# Patient Record
Sex: MALE | Race: WHITE | Employment: FULL TIME | URBAN - METROPOLITAN AREA
[De-identification: names, ages, dates, MRNs, and addresses within clinical notes are randomized per-mention and may not be internally consistent; named-entity substitution may affect disease eponyms.]

---

## 2017-01-11 RX ORDER — FLUTICASONE PROPIONATE 50 MCG
SPRAY, SUSPENSION (ML) NASAL
Qty: 1 BOTTLE | Refills: 11 | Status: SHIPPED | OUTPATIENT
Start: 2017-01-11 | End: 2018-01-30

## 2017-01-11 NOTE — TELEPHONE ENCOUNTER
E request received  LOV: 12/18/15- allergies  Last rx: 12/18/15- 1 bottle with 11 refills  No future appointments.   Per protocol to provider  Pended as same

## 2017-02-10 ENCOUNTER — OFFICE VISIT (OUTPATIENT)
Dept: INTERNAL MEDICINE CLINIC | Facility: CLINIC | Age: 27
End: 2017-02-10

## 2017-02-10 VITALS
DIASTOLIC BLOOD PRESSURE: 70 MMHG | HEART RATE: 70 BPM | HEIGHT: 73 IN | RESPIRATION RATE: 16 BRPM | TEMPERATURE: 98 F | WEIGHT: 203.81 LBS | SYSTOLIC BLOOD PRESSURE: 116 MMHG | BODY MASS INDEX: 27.01 KG/M2

## 2017-02-10 DIAGNOSIS — L01.00 IMPETIGO: Primary | ICD-10-CM

## 2017-02-10 PROCEDURE — 99213 OFFICE O/P EST LOW 20 MIN: CPT | Performed by: FAMILY MEDICINE

## 2017-02-11 NOTE — PATIENT INSTRUCTIONS
Understanding Impetigo  Impetigo is a common bacterial infection of the skin. It most often affects the face, arms, and legs. But it can appear on any part of the body. Anyone can have it, regardless of age. But it is most common in children.  Impetigo is · To prevent spreading the infection, wash your hands often. Avoid sharing personal items, towels, clothes, pillows, and sheets with others. After each use, wash these items in hot water. · Clean the affected skin several times a day. Don’t scrub.  Instead

## 2017-02-11 NOTE — PROGRESS NOTES
HPI:    Patient ID: Christiano Salmon is a 27year old male. HPI  Here with few days of crusting and sores above upper lip. States he shaved and thinks this started soon after. Has been putting lotion on it but this doesn't seem to help.  Feels dry and c

## 2017-03-31 ENCOUNTER — OFFICE VISIT (OUTPATIENT)
Dept: INTERNAL MEDICINE CLINIC | Facility: CLINIC | Age: 27
End: 2017-03-31

## 2017-03-31 VITALS
DIASTOLIC BLOOD PRESSURE: 70 MMHG | BODY MASS INDEX: 27.52 KG/M2 | RESPIRATION RATE: 16 BRPM | HEART RATE: 64 BPM | WEIGHT: 207.63 LBS | TEMPERATURE: 98 F | SYSTOLIC BLOOD PRESSURE: 124 MMHG | HEIGHT: 73 IN

## 2017-03-31 DIAGNOSIS — K29.00 ACUTE GASTRITIS WITHOUT HEMORRHAGE, UNSPECIFIED GASTRITIS TYPE: Primary | ICD-10-CM

## 2017-03-31 PROCEDURE — 99213 OFFICE O/P EST LOW 20 MIN: CPT | Performed by: FAMILY MEDICINE

## 2017-03-31 RX ORDER — RANITIDINE 150 MG/1
150 TABLET ORAL 2 TIMES DAILY
Qty: 28 TABLET | Refills: 0 | Status: SHIPPED | OUTPATIENT
Start: 2017-03-31 | End: 2017-04-14

## 2017-03-31 NOTE — PROGRESS NOTES
HPI:    Patient ID: Lucero Adair is a 27year old male. HPI Here with c/o feeling LUQ pain, irritation, burning for 2 weeks. This occurs after eating. Is non-radiating. Goes away on its own- patient hasn't tried taking anything for this. No fever. Refills    RaNITidine HCl 150 MG Oral Tab 28 tablet 0      Sig: Take 1 tablet (150 mg total) by mouth 2 (two) times daily.            Imaging & Referrals:  None       #[id number]

## 2017-03-31 NOTE — PATIENT INSTRUCTIONS
Gastritis (Adult)    Gastritis is inflammation and irritation of the stomach lining. It can be present for a short time (acute) or be long lasting (chronic).  Gastritis is often caused by infection with bacteria called H pylori. More than a third of peopl · Stomach pain that gets worse or moves to the lower right abdomen (appendix area)  · Chest pain that appears or gets worse, or spreads to the back, neck, shoulder, or arm  · Frequent vomiting (can’t keep down liquids)  · Blood in the stool or vomit (red o

## 2017-03-31 NOTE — H&P
[facility], [address] [place] [address] Cam    History and Physical     Patient Status:  No patient class for patient encounter    9/15/1990 MRN [id number]   Location [address] Attending No att.  pro Lipids Maternal Grandmother      hyperlipidemia   • Hypertension Maternal Grandmother    • Diabetes Maternal Grandmother    • Heart Disorder Maternal Grandmother      atrial fibrillation   • Thyroid Disorder Other      hypothyroidism, Siblings   • Nancy

## 2017-03-31 NOTE — PROGRESS NOTES
HPI:    Patient ID: Prashant Jackson is a 27year old male. HPI   Student note:     HPI CC of LUQ sharp pain, started 2 weeks ago for unknown reasons.  Of note, patient was recently promoted to  at [address] [place] [address] around the same time and admit were placed in this encounter. 1. Discussed probable gastroenteritis and GERD as well as triggers. Prescribed Ranitidine 1 tabs BID for 1-2 weeks. If churning, burping, bloating pain does not subside suggested taking OTC gas-x.      Meds This Visit:  S

## 2017-06-07 ENCOUNTER — OFFICE VISIT (OUTPATIENT)
Dept: INTERNAL MEDICINE CLINIC | Facility: CLINIC | Age: 27
End: 2017-06-07

## 2017-06-07 ENCOUNTER — TELEPHONE (OUTPATIENT)
Dept: INTERNAL MEDICINE CLINIC | Facility: CLINIC | Age: 27
End: 2017-06-07

## 2017-06-07 VITALS
WEIGHT: 198 LBS | HEIGHT: 73 IN | TEMPERATURE: 98 F | SYSTOLIC BLOOD PRESSURE: 124 MMHG | RESPIRATION RATE: 17 BRPM | OXYGEN SATURATION: 98 % | BODY MASS INDEX: 26.24 KG/M2 | DIASTOLIC BLOOD PRESSURE: 70 MMHG | HEART RATE: 73 BPM

## 2017-06-07 DIAGNOSIS — Z13.0 SCREENING FOR DISORDER OF BLOOD AND BLOOD-FORMING ORGANS: ICD-10-CM

## 2017-06-07 DIAGNOSIS — Z00.00 ROUTINE GENERAL MEDICAL EXAMINATION AT A HEALTH CARE FACILITY: Primary | ICD-10-CM

## 2017-06-07 DIAGNOSIS — S16.1XXA NECK STRAIN, INITIAL ENCOUNTER: Primary | ICD-10-CM

## 2017-06-07 DIAGNOSIS — Z13.228 SCREENING FOR METABOLIC DISORDER: ICD-10-CM

## 2017-06-07 DIAGNOSIS — Z13.220 SCREENING FOR LIPID DISORDERS: ICD-10-CM

## 2017-06-07 PROCEDURE — 99213 OFFICE O/P EST LOW 20 MIN: CPT | Performed by: FAMILY MEDICINE

## 2017-06-07 RX ORDER — CYCLOBENZAPRINE HCL 10 MG
10 TABLET ORAL NIGHTLY
Qty: 14 TABLET | Refills: 0 | Status: SHIPPED | OUTPATIENT
Start: 2017-06-07 | End: 2017-11-28

## 2017-06-07 RX ORDER — METHYLPREDNISOLONE 4 MG/1
TABLET ORAL
Qty: 1 KIT | Refills: 0 | Status: SHIPPED | OUTPATIENT
Start: 2017-06-07 | End: 2017-06-27
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
## 2017-06-08 NOTE — PATIENT INSTRUCTIONS
Neck Sprain or Strain  A sudden force that causes turning or bending of the neck can cause sprain or strain. An example would be the force from a car accident. This can stretch or tear muscles called a strain.  It can also stretch or tear ligaments called Follow up with your healthcare provider as directed. Physical therapy may be needed. Sometimes fractures don’t show up on the first X-ray. Bruises and sprains can sometimes hurt as much as a fracture. These injuries can take time to heal completely.  If yo

## 2017-06-13 NOTE — TELEPHONE ENCOUNTER
LOV:6/7/17  Future Appt: 6/27/17  Last labs: none  Protocol to provider   Please advise what/if any other labs needed.

## 2017-06-14 NOTE — TELEPHONE ENCOUNTER
Note above says send orders to Sahra Regan. He has [facility] listed as his lab. Please call to find out if [employer]. I placed for [facility] so change if [facility].

## 2017-06-14 NOTE — TELEPHONE ENCOUNTER
Left a detailed VM (at preferred number- ok per HIPPA)  informing Pt that labs were placed and Pt is to be fasting. Pt was advised to call clinic if any concerns or changes.

## 2017-06-27 ENCOUNTER — OFFICE VISIT (OUTPATIENT)
Dept: INTERNAL MEDICINE CLINIC | Facility: CLINIC | Age: 27
End: 2017-06-27

## 2017-06-27 VITALS
SYSTOLIC BLOOD PRESSURE: 116 MMHG | TEMPERATURE: 98 F | HEIGHT: 73 IN | RESPIRATION RATE: 17 BRPM | WEIGHT: 192.63 LBS | BODY MASS INDEX: 25.53 KG/M2 | HEART RATE: 84 BPM | DIASTOLIC BLOOD PRESSURE: 80 MMHG

## 2017-06-27 DIAGNOSIS — Z00.00 ANNUAL PHYSICAL EXAM: Primary | ICD-10-CM

## 2017-06-27 DIAGNOSIS — E78.1 HYPERTRIGLYCERIDEMIA: ICD-10-CM

## 2017-06-27 PROCEDURE — 90715 TDAP VACCINE 7 YRS/> IM: CPT | Performed by: FAMILY MEDICINE

## 2017-06-27 PROCEDURE — 90471 IMMUNIZATION ADMIN: CPT | Performed by: FAMILY MEDICINE

## 2017-06-27 PROCEDURE — 99395 PREV VISIT EST AGE 18-39: CPT | Performed by: FAMILY MEDICINE

## 2017-06-27 NOTE — PATIENT INSTRUCTIONS
Prevention Guidelines, Men Ages 25 to 44  Screening tests and vaccines are an important part of managing your health. Health counseling is essential, too. Below are guidelines for these, for men ages 25 to 44.  Talk with your healthcare provider to make s Hepatitis B Men at increased risk for infection – talk with your healthcare provider 3 doses over 6 months; second dose should be given 1 month after the first dose; the third dose should be given at least 2 months after the second dose and at least 4 mat Date Last Reviewed: 3/30/2015  © 0998-8338 [address], [address] [place], [place] [address]. All rights reserved. This information is not intended as a substitute for professional medical care.  Always follow your healthcare professional

## 2017-06-27 NOTE — PROGRESS NOTES
HPI:    Patient ID: Josiah Rodriguez is a 27year old male. HPI Here for annual check-up. Patient has no complaints. Job is very physical and as a result patient has lost weight. Still eats a lot of fast food though.      Past Medical History:   Diagnos Grandfather    • Cancer Maternal Grandfather    • Thyroid Disorder Other      hypothyroidism, Siblings   • Arthritis Maternal Grandfather          Review of Systems   Constitutional: Negative for chills, fatigue and fever.    HENT: Negative for hearing loss sounds normal.   Neurological: He is alert and oriented to person, place, and time. Skin: Skin is warm and dry. Psychiatric: He has a normal mood and affect. His behavior is normal.   Vitals reviewed.              ASSESSMENT/PLAN:   Annual physical exam

## 2017-11-28 ENCOUNTER — OFFICE VISIT (OUTPATIENT)
Dept: INTERNAL MEDICINE CLINIC | Facility: CLINIC | Age: 27
End: 2017-11-28

## 2017-11-28 VITALS
WEIGHT: 207 LBS | HEART RATE: 98 BPM | SYSTOLIC BLOOD PRESSURE: 118 MMHG | TEMPERATURE: 98 F | BODY MASS INDEX: 27.43 KG/M2 | HEIGHT: 73 IN | RESPIRATION RATE: 16 BRPM | DIASTOLIC BLOOD PRESSURE: 76 MMHG

## 2017-11-28 DIAGNOSIS — J00 ACUTE NASOPHARYNGITIS: Primary | ICD-10-CM

## 2017-11-28 PROCEDURE — 99213 OFFICE O/P EST LOW 20 MIN: CPT | Performed by: FAMILY MEDICINE

## 2017-11-28 RX ORDER — IBUPROFEN 600 MG/1
600 TABLET ORAL EVERY 8 HOURS PRN
Qty: 30 TABLET | Refills: 0 | Status: SHIPPED | OUTPATIENT
Start: 2017-11-28 | End: 2018-12-19

## 2017-11-28 NOTE — PROGRESS NOTES
HPI:    Patient ID: Joanna Correa is a 32year old male. HPI Here with 2 day h/o sore throat, cough, runny nose. No fever. No shortness of breath. Not taking anything in addition to the Flonase he takes regularly.      Review of Systems   Constitutio (eight) hours as needed for Pain.            Imaging & Referrals:  None       HR#[id number]

## 2017-12-04 ENCOUNTER — TELEPHONE (OUTPATIENT)
Dept: INTERNAL MEDICINE CLINIC | Facility: CLINIC | Age: 27
End: 2017-12-04

## 2017-12-04 NOTE — TELEPHONE ENCOUNTER
Called pt to inform ,per AMS, may come in for further evaluation or may monitor to see if resolves- ok to try Benadryl for itch.  Pt verbalized understanding and agreed with POC, stating will monitor for now, will follow up office visit if symptoms worsen,

## 2017-12-04 NOTE — TELEPHONE ENCOUNTER
Pt stating was seen on 11/28/17 for sore throat. For one week has been taking ibuprofen 600 1 tablet daily. Noted last night break out rash all over from body- neck down to chest, arms, legs. Appeared blister-like and itchy.  Today rash has subsided on uppe

## 2017-12-04 NOTE — TELEPHONE ENCOUNTER
Pt stated taking ibuprofen 600 MG Oral Tab a week ago and lastnight pt had spots all over and now today only on his thighs-also vomitted lastnight-woke up today feels queezy and rash only on thighs now-reaction to this med?  Call to advise

## 2017-12-06 ENCOUNTER — OFFICE VISIT (OUTPATIENT)
Dept: INTERNAL MEDICINE CLINIC | Facility: CLINIC | Age: 27
End: 2017-12-06

## 2017-12-06 VITALS
WEIGHT: 208 LBS | BODY MASS INDEX: 27 KG/M2 | RESPIRATION RATE: 16 BRPM | TEMPERATURE: 98 F | DIASTOLIC BLOOD PRESSURE: 66 MMHG | HEART RATE: 70 BPM | SYSTOLIC BLOOD PRESSURE: 108 MMHG

## 2017-12-06 DIAGNOSIS — J02.9 PHARYNGITIS, UNSPECIFIED ETIOLOGY: Primary | ICD-10-CM

## 2017-12-06 DIAGNOSIS — R21 RASH: ICD-10-CM

## 2017-12-06 PROCEDURE — 87081 CULTURE SCREEN ONLY: CPT | Performed by: FAMILY MEDICINE

## 2017-12-06 PROCEDURE — 87880 STREP A ASSAY W/OPTIC: CPT | Performed by: FAMILY MEDICINE

## 2017-12-06 PROCEDURE — 99213 OFFICE O/P EST LOW 20 MIN: CPT | Performed by: FAMILY MEDICINE

## 2017-12-06 RX ORDER — PREDNISONE 20 MG/1
TABLET ORAL
Qty: 14 TABLET | Refills: 0 | Status: SHIPPED | OUTPATIENT
Start: 2017-12-06 | End: 2018-02-03

## 2017-12-06 NOTE — PROGRESS NOTES
HPI:    Patient ID: Zack Hightower is a 32year old male. HPI Here with rash for about 3 days. Itchy, was initially all over his body but now mainly his thighs and right arm. Hasn't had this rash before. No new products/foods.  Did start taking Rx Ibu proper use of medication. Call or return if persists or worsens.      Orders Placed This Encounter      Rapid Strep      Grp A Strep Cult, Throat [E]    Meds This Visit:  Signed Prescriptions Disp Refills    predniSONE 20 MG Oral Tab 14 tablet 0      Sig: 3

## 2018-01-30 RX ORDER — FLUTICASONE PROPIONATE 50 MCG
SPRAY, SUSPENSION (ML) NASAL
Qty: 3 BOTTLE | Refills: 3 | Status: SHIPPED | OUTPATIENT
Start: 2018-01-30 | End: 2019-02-11

## 2018-01-30 NOTE — TELEPHONE ENCOUNTER
E request  Medication(s) to Refill:   Pending Prescriptions Disp Refills    FLUTICASONE PROPIONATE 50 MCG/ACT Nasal Suspension [Pharmacy Med Name: FLUTICASONE 50MCG   [facility]]  11     Sig: USE TWO SPRAY(S) IN EACH NOSTRIL ONCE DAILY           Last Time Medicat

## 2018-02-03 ENCOUNTER — OFFICE VISIT (OUTPATIENT)
Dept: FAMILY MEDICINE CLINIC | Facility: CLINIC | Age: 28
End: 2018-02-03

## 2018-02-03 VITALS
WEIGHT: 208 LBS | DIASTOLIC BLOOD PRESSURE: 86 MMHG | RESPIRATION RATE: 20 BRPM | HEART RATE: 61 BPM | OXYGEN SATURATION: 98 % | BODY MASS INDEX: 27.57 KG/M2 | SYSTOLIC BLOOD PRESSURE: 136 MMHG | TEMPERATURE: 98 F | HEIGHT: 73 IN

## 2018-02-03 DIAGNOSIS — J11.1 INFLUENZA-LIKE ILLNESS: Primary | ICD-10-CM

## 2018-02-03 PROCEDURE — 99213 OFFICE O/P EST LOW 20 MIN: CPT | Performed by: PHYSICIAN ASSISTANT

## 2018-02-03 NOTE — PROGRESS NOTES
CHIEF COMPLAINT:   Patient presents with:  Nasal Congestion: sinus pressure,bodyache,fever,sore throat and coughing x 1 wk      HPI:   Chato Mcdonald is a 32year old male who presents for sudden onset of flu-like symptoms. Symptoms began 1 week ago.   P lesions  HEENT: See HPI  LUNGS: denies shortness of breath or wheezing, See HPI  CARDIOVASCULAR: denies chest pain or palpitations   GI: denies abdominal pain      EXAM:   /86   Pulse 61   Temp 98.4 °F (36.9 °C) (Oral)   Resp 20   Ht 73\"   Wt 208 lb like illness/ nasal congestion    1. At home Flonase  2. OTC symptomatic support  3. Increase fluids/ rest  4. Follow up with PCP  5.  If return of fever or worsening symptoms seek treatment      The patient indicates understanding of these issues and agre

## 2018-02-03 NOTE — PATIENT INSTRUCTIONS
Influenza like illness/ nasal congestion    1. At home Flonase  2. OTC symptomatic support  3. Increase fluids/ rest  4. Follow up with PCP  5.  If return of fever or worsening symptoms seek treatment

## 2018-05-15 ENCOUNTER — OFFICE VISIT (OUTPATIENT)
Dept: INTERNAL MEDICINE CLINIC | Facility: CLINIC | Age: 28
End: 2018-05-15

## 2018-05-15 VITALS
SYSTOLIC BLOOD PRESSURE: 122 MMHG | OXYGEN SATURATION: 98 % | BODY MASS INDEX: 28.91 KG/M2 | HEART RATE: 74 BPM | DIASTOLIC BLOOD PRESSURE: 76 MMHG | TEMPERATURE: 98 F | HEIGHT: 73 IN | RESPIRATION RATE: 18 BRPM | WEIGHT: 218.13 LBS
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
## 2018-05-15 DIAGNOSIS — S39.012A BACK STRAIN, INITIAL ENCOUNTER: ICD-10-CM

## 2018-05-15 PROCEDURE — 99998 NO SHOW: CPT | Performed by: FAMILY MEDICINE

## 2018-05-15 PROCEDURE — 99213 OFFICE O/P EST LOW 20 MIN: CPT | Performed by: FAMILY MEDICINE

## 2018-05-15 RX ORDER — NAPROXEN 500 MG/1
500 TABLET ORAL 2 TIMES DAILY WITH MEALS
Qty: 28 TABLET | Refills: 0 | Status: SHIPPED | OUTPATIENT
Start: 2018-05-15 | End: 2018-12-19 | Stop reason: ALTCHOICE
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
## 2018-06-21 ENCOUNTER — PATIENT MESSAGE (OUTPATIENT)
Dept: INTERNAL MEDICINE CLINIC | Facility: CLINIC | Age: 28
End: 2018-06-21

## 2018-06-22 NOTE — TELEPHONE ENCOUNTER
Spoke with pt, states sharp, stabbing pain in lumbar area, mainly just bending, 7/10 pain, no OTC pain meds yet, started 2 weeks ago after starting new job, still has some naproxen that he was going to take. Reports it is still hard to sleep.   Due to pt's
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
## 2018-06-22 NOTE — TELEPHONE ENCOUNTER
Called pt, left VM advising pt to call back to schedule next week to see AS. Advised if pt is unable to tolerate pain over the weekend to proceed to urgent care or ER.

## 2018-06-22 NOTE — TELEPHONE ENCOUNTER
Pt called back and he scheduled appt 6/25 at 5:15 with AMS. Advised pt to try alternating ibuprofen and tylenol over the weekend for pain management. Pt verbalized understanding.

## 2018-06-25 ENCOUNTER — OFFICE VISIT (OUTPATIENT)
Dept: INTERNAL MEDICINE CLINIC | Facility: CLINIC | Age: 28
End: 2018-06-25

## 2018-06-25 VITALS
WEIGHT: 222 LBS | RESPIRATION RATE: 18 BRPM | HEIGHT: 72 IN | BODY MASS INDEX: 30.07 KG/M2 | HEART RATE: 94 BPM | TEMPERATURE: 98 F | SYSTOLIC BLOOD PRESSURE: 128 MMHG | DIASTOLIC BLOOD PRESSURE: 80 MMHG

## 2018-06-25 DIAGNOSIS — M54.9 MID BACK PAIN: Primary | ICD-10-CM

## 2018-06-25 PROCEDURE — 99213 OFFICE O/P EST LOW 20 MIN: CPT | Performed by: FAMILY MEDICINE

## 2018-06-25 RX ORDER — NAPROXEN 500 MG/1
500 TABLET ORAL 2 TIMES DAILY WITH MEALS
Qty: 28 TABLET | Refills: 0 | Status: CANCELLED | OUTPATIENT
Start: 2018-06-25
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
## 2018-07-03 ENCOUNTER — HOSPITAL ENCOUNTER (OUTPATIENT)
Dept: PHYSICAL THERAPY | Facility: HOSPITAL | Age: 28
Setting detail: THERAPIES SERIES
Discharge: HOME | End: 2018-07-03
Attending: FAMILY MEDICINE
Payer: COMMERCIAL

## 2018-07-03 DIAGNOSIS — M54.9 MID BACK PAIN: ICD-10-CM

## 2018-07-03 PROCEDURE — 97161 PT EVAL LOW COMPLEX 20 MIN: CPT

## 2018-07-03 PROCEDURE — 97140 MANUAL THERAPY 1/> REGIONS: CPT

## 2018-07-03 PROCEDURE — 97110 THERAPEUTIC EXERCISES: CPT
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
## 2018-07-10 ENCOUNTER — HOSPITAL ENCOUNTER (OUTPATIENT)
Dept: PHYSICAL THERAPY | Facility: HOSPITAL | Age: 28
Setting detail: THERAPIES SERIES
Discharge: HOME | End: 2018-07-10
Attending: FAMILY MEDICINE
Payer: COMMERCIAL

## 2018-07-10 PROCEDURE — 97140 MANUAL THERAPY 1/> REGIONS: CPT

## 2018-07-10 PROCEDURE — 97110 THERAPEUTIC EXERCISES: CPT

## 2018-07-10 NOTE — ADDENDUM NOTE
Encounter addended by: Ratna Vaughan PT on: 7/10/2018 10:15 AM<BR>    Actions taken: Sign clinical note
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
## 2018-07-17 ENCOUNTER — HOSPITAL ENCOUNTER (OUTPATIENT)
Dept: PHYSICAL THERAPY | Facility: HOSPITAL | Age: 28
Setting detail: THERAPIES SERIES
Discharge: HOME | End: 2018-07-17
Attending: FAMILY MEDICINE
Payer: COMMERCIAL

## 2018-07-17 PROCEDURE — 97140 MANUAL THERAPY 1/> REGIONS: CPT

## 2018-07-17 PROCEDURE — 97110 THERAPEUTIC EXERCISES: CPT
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
## 2018-07-19 ENCOUNTER — HOSPITAL ENCOUNTER (OUTPATIENT)
Dept: PHYSICAL THERAPY | Facility: HOSPITAL | Age: 28
Setting detail: THERAPIES SERIES
Discharge: HOME | End: 2018-07-19
Attending: FAMILY MEDICINE
Payer: COMMERCIAL

## 2018-07-19 PROCEDURE — 97110 THERAPEUTIC EXERCISES: CPT
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
## 2018-07-24 ENCOUNTER — APPOINTMENT (OUTPATIENT)
Dept: PHYSICAL THERAPY | Facility: HOSPITAL | Age: 28
End: 2018-07-24
Attending: FAMILY MEDICINE
Payer: COMMERCIAL

## 2018-07-26 ENCOUNTER — HOSPITAL ENCOUNTER (OUTPATIENT)
Dept: PHYSICAL THERAPY | Facility: HOSPITAL | Age: 28
Setting detail: THERAPIES SERIES
Discharge: HOME | End: 2018-07-26
Attending: FAMILY MEDICINE
Payer: COMMERCIAL

## 2018-07-26 PROCEDURE — 97110 THERAPEUTIC EXERCISES: CPT
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
## 2018-07-31 ENCOUNTER — APPOINTMENT (OUTPATIENT)
Dept: PHYSICAL THERAPY | Facility: HOSPITAL | Age: 28
End: 2018-07-31
Attending: FAMILY MEDICINE
Payer: COMMERCIAL

## 2018-08-02 ENCOUNTER — APPOINTMENT (OUTPATIENT)
Dept: PHYSICAL THERAPY | Facility: HOSPITAL | Age: 28
End: 2018-08-02
Attending: FAMILY MEDICINE
Payer: COMMERCIAL

## 2018-12-19 ENCOUNTER — OFFICE VISIT (OUTPATIENT)
Dept: INTERNAL MEDICINE CLINIC | Facility: CLINIC | Age: 28
End: 2018-12-19
Payer: COMMERCIAL

## 2018-12-19 VITALS
WEIGHT: 232 LBS | DIASTOLIC BLOOD PRESSURE: 82 MMHG | TEMPERATURE: 98 F | SYSTOLIC BLOOD PRESSURE: 122 MMHG | BODY MASS INDEX: 31 KG/M2 | HEART RATE: 72 BPM | RESPIRATION RATE: 16 BRPM

## 2018-12-19 DIAGNOSIS — Z00.00 ANNUAL PHYSICAL EXAM: Primary | ICD-10-CM

## 2018-12-19 PROCEDURE — 99395 PREV VISIT EST AGE 18-39: CPT | Performed by: FAMILY MEDICINE
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
## 2018-12-28 ENCOUNTER — LAB ENCOUNTER (OUTPATIENT)
Dept: LAB | Age: 28
End: 2018-12-28
Attending: FAMILY MEDICINE
Payer: COMMERCIAL

## 2018-12-28 DIAGNOSIS — Z13.0 SCREENING FOR DEFICIENCY ANEMIA: ICD-10-CM

## 2018-12-28 DIAGNOSIS — Z13.1 SCREENING FOR DIABETES MELLITUS: ICD-10-CM

## 2018-12-28 DIAGNOSIS — Z13.220 SCREENING, LIPID: ICD-10-CM

## 2018-12-28 DIAGNOSIS — Z13.220 SCREENING, LIPID: Primary | ICD-10-CM

## 2018-12-28 PROCEDURE — 80053 COMPREHEN METABOLIC PANEL: CPT | Performed by: FAMILY MEDICINE

## 2018-12-28 PROCEDURE — 85025 COMPLETE CBC W/AUTO DIFF WBC: CPT | Performed by: FAMILY MEDICINE

## 2018-12-28 PROCEDURE — 80061 LIPID PANEL: CPT | Performed by: FAMILY MEDICINE

## 2019-01-21 ENCOUNTER — OFFICE VISIT (OUTPATIENT)
Dept: INTERNAL MEDICINE CLINIC | Facility: CLINIC | Age: 29
End: 2019-01-21
Payer: COMMERCIAL

## 2019-01-21 VITALS
SYSTOLIC BLOOD PRESSURE: 130 MMHG | TEMPERATURE: 98 F | WEIGHT: 231 LBS | DIASTOLIC BLOOD PRESSURE: 80 MMHG | HEART RATE: 72 BPM | BODY MASS INDEX: 31 KG/M2 | RESPIRATION RATE: 16 BRPM

## 2019-01-21 DIAGNOSIS — J06.9 UPPER RESPIRATORY TRACT INFECTION, UNSPECIFIED TYPE: Primary | ICD-10-CM

## 2019-01-21 PROCEDURE — 99213 OFFICE O/P EST LOW 20 MIN: CPT | Performed by: FAMILY MEDICINE

## 2019-01-21 RX ORDER — BENZONATATE 100 MG/1
CAPSULE ORAL
Qty: 30 CAPSULE | Refills: 0 | Status: SHIPPED | OUTPATIENT
Start: 2019-01-21 | End: 2020-11-06

## 2019-01-21 NOTE — PROGRESS NOTES
HPI:    Patient ID: Alcides Brownlee is a 25year old male. HPI Here with 3 days of sore throat and one day of cough, ear pain, runny nose. No fever. No shortness of breath. Not taking anything for this.      Review of Systems   Constitutional: Negative Disp Refills   • benzonatate 100 MG Oral Cap 30 capsule 0     Si-2 caps PO TID PRN       Imaging & Referrals:  None       RD#[id number]

## 2019-02-11 ENCOUNTER — TELEPHONE (OUTPATIENT)
Dept: INTERNAL MEDICINE CLINIC | Facility: CLINIC | Age: 29
End: 2019-02-11

## 2019-02-11 RX ORDER — FLUTICASONE PROPIONATE 50 MCG
SPRAY, SUSPENSION (ML) NASAL
Qty: 3 BOTTLE | Refills: 3 | Status: SHIPPED | OUTPATIENT
Start: 2019-02-11 | End: 2019-02-14

## 2019-02-11 NOTE — TELEPHONE ENCOUNTER
Last OV : 1/21/19 sick visit  Upcoming appt/reason: None on file. Last refill date: 1/30/18     #/refills: 3 bottles/ 3  Last labs: 12/28/18  When pt was asked to return for OV: Return if symptoms worsen or fail to improve      Per protocol route. Detail Level: Detailed Continue Regimen: Clindamycin solution

## 2019-02-14 RX ORDER — FLUTICASONE PROPIONATE 50 MCG
SPRAY, SUSPENSION (ML) NASAL
Qty: 3 BOTTLE | Refills: 3 | Status: SHIPPED | OUTPATIENT
Start: 2019-02-14

## 2019-02-14 NOTE — TELEPHONE ENCOUNTER
Please re-send this prescription to Praveen Low. The [employer] does not cover with his new Insurance.

## 2019-06-03 ENCOUNTER — OFFICE VISIT (OUTPATIENT)
Dept: INTERNAL MEDICINE CLINIC | Facility: CLINIC | Age: 29
End: 2019-06-03
Payer: COMMERCIAL

## 2019-06-03 VITALS
DIASTOLIC BLOOD PRESSURE: 70 MMHG | HEART RATE: 61 BPM | HEIGHT: 72 IN | RESPIRATION RATE: 16 BRPM | WEIGHT: 230 LBS | OXYGEN SATURATION: 97 % | BODY MASS INDEX: 31.15 KG/M2 | TEMPERATURE: 97 F | SYSTOLIC BLOOD PRESSURE: 120 MMHG

## 2019-06-03 DIAGNOSIS — M25.50 ARTHRALGIA, UNSPECIFIED JOINT: Primary | ICD-10-CM

## 2019-06-03 DIAGNOSIS — R53.83 FATIGUE, UNSPECIFIED TYPE: ICD-10-CM

## 2019-06-03 PROCEDURE — 99214 OFFICE O/P EST MOD 30 MIN: CPT | Performed by: FAMILY MEDICINE

## 2019-06-03 NOTE — PROGRESS NOTES
HPI:    Patient ID: Hallie Thibodeaux is a 25year old male. HPI Here with 2 weeks of waking up in the morning with joint pain in all joints. About 5 days of fatigue per patient as well. No fever. No rash. No recent travel. No abd pain/N/V/D.  Mild sore rate, regular rhythm and normal heart sounds. Pulmonary/Chest: Effort normal and breath sounds normal.   Neurological: He is alert and oriented to person, place, and time. Skin: Skin is warm and dry. Psychiatric: He has a normal mood and affect.  His

## 2019-06-04 ENCOUNTER — LAB ENCOUNTER (OUTPATIENT)
Dept: LAB | Age: 29
End: 2019-06-04
Attending: FAMILY MEDICINE
Payer: COMMERCIAL

## 2019-06-04 DIAGNOSIS — R53.83 FATIGUE, UNSPECIFIED TYPE: ICD-10-CM

## 2019-06-04 DIAGNOSIS — M25.50 ARTHRALGIA, UNSPECIFIED JOINT: ICD-10-CM

## 2019-06-04 PROCEDURE — 85652 RBC SED RATE AUTOMATED: CPT | Performed by: FAMILY MEDICINE

## 2019-06-04 PROCEDURE — 86431 RHEUMATOID FACTOR QUANT: CPT | Performed by: FAMILY MEDICINE

## 2019-06-04 PROCEDURE — 86403 PARTICLE AGGLUT ANTBDY SCRN: CPT | Performed by: FAMILY MEDICINE

## 2019-06-04 PROCEDURE — 80050 GENERAL HEALTH PANEL: CPT | Performed by: FAMILY MEDICINE

## 2019-06-04 PROCEDURE — 86140 C-REACTIVE PROTEIN: CPT | Performed by: FAMILY MEDICINE

## 2019-06-04 PROCEDURE — 84439 ASSAY OF FREE THYROXINE: CPT | Performed by: FAMILY MEDICINE

## 2019-06-05 DIAGNOSIS — R79.89 ABNORMAL THYROID BLOOD TEST: Primary | ICD-10-CM

## 2019-08-23 ENCOUNTER — APPOINTMENT (OUTPATIENT)
Dept: LAB | Age: 29
End: 2019-08-23
Attending: FAMILY MEDICINE
Payer: COMMERCIAL

## 2019-08-23 DIAGNOSIS — R79.89 ABNORMAL THYROID BLOOD TEST: ICD-10-CM

## 2019-08-23 LAB — TSI SER-ACNC: 1.54 MIU/ML (ref 0.36–3.74)

## 2019-08-23 PROCEDURE — 84443 ASSAY THYROID STIM HORMONE: CPT | Performed by: FAMILY MEDICINE

## 2019-08-26 ENCOUNTER — APPOINTMENT (OUTPATIENT)
Dept: GENERAL RADIOLOGY | Age: 29
End: 2019-08-26
Attending: FAMILY MEDICINE
Payer: COMMERCIAL

## 2019-08-26 ENCOUNTER — HOSPITAL ENCOUNTER (OUTPATIENT)
Age: 29
Discharge: HOME | End: 2019-08-26
Attending: FAMILY MEDICINE
Payer: COMMERCIAL

## 2019-08-26 VITALS
DIASTOLIC BLOOD PRESSURE: 95 MMHG | TEMPERATURE: 99 F | RESPIRATION RATE: 16 BRPM | HEIGHT: 73 IN | WEIGHT: 225 LBS | BODY MASS INDEX: 29.82 KG/M2 | SYSTOLIC BLOOD PRESSURE: 130 MMHG | OXYGEN SATURATION: 98 % | HEART RATE: 68 BPM

## 2019-08-26 DIAGNOSIS — R07.89 CHEST PAIN, ATYPICAL: Primary | ICD-10-CM

## 2019-08-26 DIAGNOSIS — E03.8 SUBCLINICAL HYPOTHYROIDISM: ICD-10-CM

## 2019-08-26 LAB
#MXD IC: 0.6 X10ˆ3/UL (ref 0.1–1)
ATRIAL RATE: 69 BPM
CREAT BLD-MCNC: 1 MG/DL (ref 0.7–1.3)
GLUCOSE BLD-MCNC: 94 MG/DL (ref 70–99)
HCT VFR BLD AUTO: 44.2 % (ref 39–53)
HGB BLD-MCNC: 15 G/DL (ref 13–17.5)
ISTAT BUN: 8 MG/DL (ref 8–20)
ISTAT CHLORIDE: 102 MMOL/L (ref 101–111)
ISTAT HEMATOCRIT: 45 % (ref 37–53)
ISTAT IONIZED CALCIUM FOR CHEM 8: 1.18 MMOL/L (ref 1.12–1.32)
ISTAT POTASSIUM: 4 MMOL/L (ref 3.6–5.1)
ISTAT SODIUM: 137 MMOL/L (ref 136–145)
ISTAT TROPONIN: <0.1 NG/ML (ref ?–0.1)
LYMPHOCYTES # BLD AUTO: 2.3 X10ˆ3/UL (ref 1–4)
LYMPHOCYTES NFR BLD AUTO: 39 %
MCH RBC QN AUTO: 30.5 PG (ref 26–34)
MCHC RBC AUTO-ENTMCNC: 33.9 G/DL (ref 31–37)
MCV RBC AUTO: 90 FL (ref 80–100)
MIXED CELL %: 10.4 %
NEUTROPHILS # BLD AUTO: 2.9 X10ˆ3/UL (ref 1.5–7.7)
NEUTROPHILS NFR BLD AUTO: 50.6 %
P AXIS: 66 DEGREES
P-R INTERVAL: 140 MS
PLATELET # BLD AUTO: 224 X10ˆ3/UL (ref 150–450)
Q-T INTERVAL: 372 MS
QRS DURATION: 98 MS
QTC CALCULATION (BEZET): 398 MS
R AXIS: 67 DEGREES
RBC # BLD AUTO: 4.91 X10ˆ6/UL (ref 4.3–5.7)
T AXIS: 55 DEGREES
VENTRICULAR RATE: 69 BPM
WBC # BLD AUTO: 5.8 X10ˆ3/UL (ref 4–11)

## 2019-08-26 PROCEDURE — 96372 THER/PROPH/DIAG INJ SC/IM: CPT

## 2019-08-26 PROCEDURE — 99205 OFFICE O/P NEW HI 60 MIN: CPT

## 2019-08-26 PROCEDURE — 80047 BASIC METABLC PNL IONIZED CA: CPT

## 2019-08-26 PROCEDURE — 99215 OFFICE O/P EST HI 40 MIN: CPT

## 2019-08-26 PROCEDURE — 71046 X-RAY EXAM CHEST 2 VIEWS: CPT | Performed by: FAMILY MEDICINE

## 2019-08-26 PROCEDURE — 84484 ASSAY OF TROPONIN QUANT: CPT

## 2019-08-26 PROCEDURE — 85025 COMPLETE CBC W/AUTO DIFF WBC: CPT | Performed by: FAMILY MEDICINE

## 2019-08-26 PROCEDURE — 36415 COLL VENOUS BLD VENIPUNCTURE: CPT

## 2019-08-26 PROCEDURE — 93010 ELECTROCARDIOGRAM REPORT: CPT

## 2019-08-26 PROCEDURE — 93005 ELECTROCARDIOGRAM TRACING: CPT

## 2019-08-26 PROCEDURE — 93010 ELECTROCARDIOGRAM REPORT: CPT | Performed by: INTERNAL MEDICINE

## 2019-08-26 RX ORDER — KETOROLAC TROMETHAMINE 30 MG/ML
30 INJECTION, SOLUTION INTRAMUSCULAR; INTRAVENOUS ONCE
Status: DISCONTINUED | OUTPATIENT
Start: 2019-08-26 | End: 2019-08-26

## 2019-08-26 RX ORDER — NAPROXEN 500 MG/1
500 TABLET ORAL 2 TIMES DAILY PRN
Qty: 20 TABLET | Refills: 0 | Status: SHIPPED | OUTPATIENT
Start: 2019-08-26 | End: 2019-09-05

## 2019-08-26 RX ORDER — KETOROLAC TROMETHAMINE 30 MG/ML
30 INJECTION, SOLUTION INTRAMUSCULAR; INTRAVENOUS ONCE
Status: COMPLETED | OUTPATIENT
Start: 2019-08-26 | End: 2019-08-26

## 2019-08-26 NOTE — ED INITIAL ASSESSMENT (HPI)
Friday, woke up with burning chest pain and pain with exhalation, difficulty focusing, fatigue and dizziness. States breathing has been getting more painful. Denies fevers.

## 2019-10-08 ENCOUNTER — TELEPHONE (OUTPATIENT)
Dept: INTERNAL MEDICINE CLINIC | Facility: CLINIC | Age: 29
End: 2019-10-08

## 2019-10-08 NOTE — TELEPHONE ENCOUNTER
Name Date   TDAP 6/27/2017     AMS- please advise re: Pneumovax 23 and if appropriate. Will order flu per protocol at time of NV. Thank you.

## 2019-10-08 NOTE — TELEPHONE ENCOUNTER
Pneumococcal Ppsv23 Medium Risk Adult   Influenza Vaccine     This is what the patient is requesting.      Future Appointments   Date Time Provider Mame Arteaga   10/11/2019  8:30 AM [facility] NURSE [facility]

## 2019-10-10 ENCOUNTER — NURSE ONLY (OUTPATIENT)
Dept: INTERNAL MEDICINE CLINIC | Facility: CLINIC | Age: 29
End: 2019-10-10
Payer: COMMERCIAL

## 2019-10-10 DIAGNOSIS — Z23 NEED FOR INFLUENZA VACCINATION: Primary | ICD-10-CM

## 2019-10-10 PROCEDURE — 90732 PPSV23 VACC 2 YRS+ SUBQ/IM: CPT | Performed by: FAMILY MEDICINE

## 2019-10-10 PROCEDURE — 90686 IIV4 VACC NO PRSV 0.5 ML IM: CPT | Performed by: FAMILY MEDICINE

## 2019-10-10 PROCEDURE — 90471 IMMUNIZATION ADMIN: CPT | Performed by: FAMILY MEDICINE

## 2019-10-10 PROCEDURE — 90472 IMMUNIZATION ADMIN EACH ADD: CPT | Performed by: FAMILY MEDICINE

## 2020-10-30 ENCOUNTER — E-VISIT (OUTPATIENT)
Dept: TELEHEALTH | Age: 30
End: 2020-10-30
Payer: OTHER GOVERNMENT

## 2020-10-30 ENCOUNTER — APPOINTMENT (OUTPATIENT)
Dept: LAB | Facility: HOSPITAL | Age: 30
End: 2020-10-30
Attending: PHYSICIAN ASSISTANT
Payer: OTHER GOVERNMENT

## 2020-10-30 DIAGNOSIS — R05.9 COUGH: ICD-10-CM

## 2020-10-30 DIAGNOSIS — J02.9 SORE THROAT: ICD-10-CM

## 2020-10-30 DIAGNOSIS — J02.9 SORE THROAT: Primary | ICD-10-CM

## 2020-10-30 DIAGNOSIS — Z11.59 ENCOUNTER FOR SCREENING FOR OTHER VIRAL DISEASES: ICD-10-CM

## 2020-10-30 PROCEDURE — 99421 OL DIG E/M SVC 5-10 MIN: CPT | Performed by: PHYSICIAN ASSISTANT

## 2020-10-30 NOTE — PROGRESS NOTES
Chinyere Clancy is a 26year old male. HPI:   See answers to questions above.      Current Outpatient Medications   Medication Sig Dispense Refill   • Fluticasone Propionate 50 MCG/ACT Nasal Suspension USE TWO SPRAY(S) IN EACH NOSTRIL ONCE DAILY 3 Bottle 6 MONTH    Drug use: No      Types: Cannabis        ASSESSMENT AND PLAN:     Sore throat  (primary encounter diagnosis)  Cough  Encounter for screening for other viral diseases        Meds & Refills for this Visit:  Requested Prescriptions      No prescrip

## 2020-10-30 NOTE — PATIENT INSTRUCTIONS
Coronavirus Disease 2019 (COVID-19): Overview  Coronavirus disease 2019 (COVID-19) is a respiratory illness. It's caused by a new (novel) coronavirus. There are many types of coronavirus. Coronaviruses are a very common cause of colds and bronchitis.  The You can check your symptoms with the CDC’s Coronavirus Self-. What are possible complications from [id number]-[id number]? In many cases, this virus can cause infection (pneumonia) in both lungs. In some cases, this can cause death.  Certain people are at highe Your healthcare provider will ask about your symptoms. He or she will ask where you live, and about your recent travel, and any contact with sick people.  If your healthcare provider thinks you may have COVID-19, he or she will consider whether to test you The most proven treatments right now are those to help your body while it fights the virus. This is known as supportive care. Supportive care may include:   · Getting rest.  This helps your body fight the illness. · Staying hydrated.   Drinking liquids is People who have had COVID-19 and are fully recovered may be asked by their healthcare team to consider donating plasma. This is called COVID-19 convalescent plasma donation.  Plasma from people fully recovered from COVID-19 may contain antibodies to help fi

## 2020-11-06 ENCOUNTER — TELEMEDICINE (OUTPATIENT)
Dept: INTERNAL MEDICINE CLINIC | Facility: CLINIC | Age: 30
End: 2020-11-06

## 2020-11-06 ENCOUNTER — HOSPITAL ENCOUNTER (OUTPATIENT)
Age: 30
Discharge: HOME | End: 2020-11-06

## 2020-11-06 ENCOUNTER — HOSPITAL ENCOUNTER (OUTPATIENT)
Age: 30
Discharge: LEFT BEFORE DISCHARGE | End: 2020-11-06
Payer: OTHER GOVERNMENT

## 2020-11-06 VITALS
DIASTOLIC BLOOD PRESSURE: 88 MMHG | WEIGHT: 230 LBS | BODY MASS INDEX: 30.48 KG/M2 | OXYGEN SATURATION: 97 % | SYSTOLIC BLOOD PRESSURE: 145 MMHG | TEMPERATURE: 97 F | HEART RATE: 83 BPM | HEIGHT: 73 IN | RESPIRATION RATE: 18 BRPM

## 2020-11-06 DIAGNOSIS — H65.91 RIGHT OTITIS MEDIA WITH EFFUSION: Primary | ICD-10-CM

## 2020-11-06 DIAGNOSIS — H92.01 RIGHT EAR PAIN: ICD-10-CM

## 2020-11-06 DIAGNOSIS — J02.9 SORE THROAT: Primary | ICD-10-CM

## 2020-11-06 PROCEDURE — 99213 OFFICE O/P EST LOW 20 MIN: CPT | Performed by: NURSE PRACTITIONER

## 2020-11-06 RX ORDER — AMOXICILLIN 875 MG/1
875 TABLET, COATED ORAL 2 TIMES DAILY
Qty: 20 TABLET | Refills: 0 | Status: SHIPPED | OUTPATIENT
Start: 2020-11-06 | End: 2020-11-16

## 2020-11-06 NOTE — ED INITIAL ASSESSMENT (HPI)
Pt arrived alert and responsive. Pt c/o ongoing symptoms of ear pain and sore throat. Pt had an e-visit and was told he get a covid test. Pt's covid tested negative 10/30.

## 2020-11-06 NOTE — PROGRESS NOTES
Talked with patient via video. Has had sore throat and now increasing right ear pain for about 10 days. Had e-visit and negative COVID19 test 10/30. Wondering what to do at this point. No fever.  Patient instructed to go to urgent care for eval. Patient agr

## 2020-11-06 NOTE — ED PROVIDER NOTES
Patient Seen in: Immediate [address] [place] [address]      History   Patient presents with:  Ear Problem  Sore Throat    Stated Complaint: EAR PAIN    17-year-old male presents to immediate care with right ear pain.   Patient states he has had sinus congestion BMI 30.34 kg/m²         Physical Exam  Vitals signs and nursing note reviewed. Constitutional:       Appearance: Normal appearance. HENT:      Head: Normocephalic. Right Ear: Ear canal normal. A middle ear effusion is present.  Tympanic membrane

## 2022-04-04 ENCOUNTER — TELEPHONE (OUTPATIENT)
Dept: INTERNAL MEDICINE CLINIC | Facility: CLINIC | Age: 32
End: 2022-04-04

## 2022-04-04 ENCOUNTER — TELEMEDICINE (OUTPATIENT)
Dept: INTERNAL MEDICINE CLINIC | Facility: CLINIC | Age: 32
End: 2022-04-04
Payer: COMMERCIAL

## 2022-04-04 DIAGNOSIS — B86 SCABIES: Primary | ICD-10-CM

## 2022-04-04 PROCEDURE — 99212 OFFICE O/P EST SF 10 MIN: CPT | Performed by: FAMILY MEDICINE

## 2022-04-04 RX ORDER — PERMETHRIN 50 MG/G
CREAM TOPICAL
Qty: 60 G | Refills: 0 | Status: SHIPPED | OUTPATIENT
Start: 2022-04-04

## 2022-04-04 NOTE — TELEPHONE ENCOUNTER
Pt snt msg thru mychart:    \"My daughter was diagnosed with scabies and I have been having same Symptoms. How should I treat my symptoms? \"

## 2022-04-04 NOTE — TELEPHONE ENCOUNTER
AMS okay with televisit.  Scheduled per below:    Future Appointments   Date Time Provider Mame Arteaga   4/4/2022  4:45 PM Tree Krause DO [facility]

## 2023-03-29 ENCOUNTER — TELEPHONE (OUTPATIENT)
Dept: INTERNAL MEDICINE CLINIC | Facility: CLINIC | Age: 33
End: 2023-03-29

## 2023-03-29 ENCOUNTER — OFFICE VISIT (OUTPATIENT)
Dept: INTERNAL MEDICINE CLINIC | Facility: CLINIC | Age: 33
End: 2023-03-29

## 2023-03-29 VITALS
HEART RATE: 86 BPM | HEIGHT: 73 IN | SYSTOLIC BLOOD PRESSURE: 130 MMHG | BODY MASS INDEX: 33.82 KG/M2 | WEIGHT: 255.19 LBS | OXYGEN SATURATION: 98 % | DIASTOLIC BLOOD PRESSURE: 72 MMHG

## 2023-03-29 DIAGNOSIS — R23.8 SKIN PIMPLE: Primary | ICD-10-CM

## 2023-03-29 PROCEDURE — 99213 OFFICE O/P EST LOW 20 MIN: CPT | Performed by: FAMILY MEDICINE

## 2023-03-29 NOTE — TELEPHONE ENCOUNTER
Called and spoke to pt. Pt said that he popped what he thought was a zit yesterday and now area around it is red. Pt is concerned it is infected and wanted to get it checked out. Offered appt with AMS this afternoon for eval. Pt agreeable and thankful. Scheduled with AMS at 3:45.      FYI to AMS

## 2023-05-23 ENCOUNTER — PATIENT MESSAGE (OUTPATIENT)
Dept: INTERNAL MEDICINE CLINIC | Facility: CLINIC | Age: 33
End: 2023-05-23

## 2023-05-23 NOTE — ED PROVIDER NOTES
Patient Seen in: [facility]    History   Patient presents with:  Dyspnea CITLALY SOB (respiratory)  Chest Pain Angina (cardiovascular)    Stated Complaint: trouble breathing / chest pain x4 days    HPI    17-year-old male with reviewed. All other systems reviewed and negative except as noted above.     Physical Exam     ED Triage Vitals [08/26/19 1243]   BP (!) 130/95   Pulse 68   Resp 16   Temp 98.7 °F (37.1 °C)   Temp src Oral   SpO2 98 %   O2 Device None (Room air) Symptoms improved over the weekend, then returned today. Pain is mostly with exhalation. He denies any acute dyspnea. He does not have recent travel history. No swelling of the legs. He is not on any exogenous hormones. Patient does smoke.  PERC=0  Pa [As Noted in HPI] : as noted in HPI [Negative] : Heme/Lymph

## 2023-05-24 ENCOUNTER — TELEPHONE (OUTPATIENT)
Dept: INTERNAL MEDICINE CLINIC | Facility: CLINIC | Age: 33
End: 2023-05-24

## 2023-05-24 NOTE — TELEPHONE ENCOUNTER
Patient calling has had sore throat since Monday and had fever of 100 yesterday that has resolved. No availability with AMS.

## 2023-05-24 NOTE — TELEPHONE ENCOUNTER
Patient states he believes he had COVID with the rest of the family starting Monday of last week, felt good over the weekend then Monday had a fever 100 for 2 days, resolved now, Home COVID test Monday was negative, a little productive cough, sore throat but no redness or white patches, using OTC  Mucinex,Ibuprofen, Allegra and Flonase. Pt declined [facility], tried to go there and they sent him to [facility] but that was $500 and he cannot afford. Appt scheduled with AMS for Friday 5/26/23 at 3:30pm.  ER warnings given. Pt verbalizes understanding.  FYI to AMS

## 2023-05-26 ENCOUNTER — OFFICE VISIT (OUTPATIENT)
Dept: INTERNAL MEDICINE CLINIC | Facility: CLINIC | Age: 33
End: 2023-05-26

## 2023-05-26 VITALS
WEIGHT: 224 LBS | SYSTOLIC BLOOD PRESSURE: 136 MMHG | HEART RATE: 92 BPM | BODY MASS INDEX: 29.69 KG/M2 | DIASTOLIC BLOOD PRESSURE: 80 MMHG | TEMPERATURE: 98 F | OXYGEN SATURATION: 97 % | HEIGHT: 73 IN | RESPIRATION RATE: 17 BRPM

## 2023-05-26 DIAGNOSIS — J06.9 UPPER RESPIRATORY TRACT INFECTION, UNSPECIFIED TYPE: ICD-10-CM

## 2023-05-26 DIAGNOSIS — H10.9 CONJUNCTIVITIS OF LEFT EYE, UNSPECIFIED CONJUNCTIVITIS TYPE: Primary | ICD-10-CM

## 2023-05-26 PROCEDURE — 99214 OFFICE O/P EST MOD 30 MIN: CPT | Performed by: FAMILY MEDICINE

## 2023-05-26 RX ORDER — DOXYCYCLINE HYCLATE 100 MG
100 TABLET ORAL 2 TIMES DAILY
Qty: 14 TABLET | Refills: 0 | Status: SHIPPED | OUTPATIENT
Start: 2023-05-26 | End: 2023-06-02

## 2023-05-26 RX ORDER — POLYMYXIN B SULFATE AND TRIMETHOPRIM 1; 10000 MG/ML; [USP'U]/ML
1 SOLUTION OPHTHALMIC EVERY 6 HOURS
Qty: 1 EACH | Refills: 0 | Status: SHIPPED | OUTPATIENT
Start: 2023-05-26

## 2024-04-02 ENCOUNTER — TELEPHONE (OUTPATIENT)
Dept: INTERNAL MEDICINE CLINIC | Facility: CLINIC | Age: 34
End: 2024-04-02

## 2024-04-02 DIAGNOSIS — Z00.00 ROUTINE GENERAL MEDICAL EXAMINATION AT A HEALTH CARE FACILITY: Primary | ICD-10-CM

## 2024-04-02 DIAGNOSIS — Z13.228 SCREENING FOR METABOLIC DISORDER: ICD-10-CM

## 2024-04-02 DIAGNOSIS — Z13.220 SCREENING FOR LIPID DISORDERS: ICD-10-CM

## 2024-04-02 DIAGNOSIS — Z13.29 SCREENING FOR THYROID DISORDER: ICD-10-CM

## 2024-04-02 DIAGNOSIS — Z13.0 SCREENING FOR DISORDER OF BLOOD AND BLOOD-FORMING ORGANS: ICD-10-CM

## 2024-04-02 NOTE — TELEPHONE ENCOUNTER
Future Appointments   Date Time Provider Department Center   4/23/2024  8:30 AM Yojana Pedroza DO [facility]     Orders to edward- Pt informed that labs need to be completed no sooner than 2 weeks prior to the appt. Pt aware to fast-no call back required

## 2024-04-02 NOTE — TELEPHONE ENCOUNTER
Labs pended.    Your Appointments      Tuesday April 23, 2024  8:30 AM  Physical - Established with Yojana Pedroza [facility], [address] [place] [address] ([facility] IM/[facility] [place]) [address] [place] [id number]  [phone]

## 2024-04-17 ENCOUNTER — LAB ENCOUNTER (OUTPATIENT)
Dept: LAB | Age: 34
End: 2024-04-17
Attending: FAMILY MEDICINE
Payer: COMMERCIAL

## 2024-04-17 DIAGNOSIS — Z13.228 SCREENING FOR METABOLIC DISORDER: ICD-10-CM

## 2024-04-17 DIAGNOSIS — Z13.0 SCREENING FOR DISORDER OF BLOOD AND BLOOD-FORMING ORGANS: ICD-10-CM

## 2024-04-17 DIAGNOSIS — Z13.29 SCREENING FOR THYROID DISORDER: ICD-10-CM

## 2024-04-17 DIAGNOSIS — Z13.220 SCREENING FOR LIPID DISORDERS: ICD-10-CM

## 2024-04-17 DIAGNOSIS — Z00.00 ROUTINE GENERAL MEDICAL EXAMINATION AT A HEALTH CARE FACILITY: ICD-10-CM

## 2024-04-17 LAB
ALBUMIN SERPL-MCNC: 3.8 G/DL (ref 3.4–5)
ALBUMIN/GLOB SERPL: 1.2 {RATIO} (ref 1–2)
ALP LIVER SERPL-CCNC: 70 U/L
ALT SERPL-CCNC: 44 U/L
ANION GAP SERPL CALC-SCNC: 5 MMOL/L (ref 0–18)
AST SERPL-CCNC: 23 U/L (ref 15–37)
BASOPHILS # BLD AUTO: 0.06 X10(3) UL (ref 0–0.2)
BASOPHILS NFR BLD AUTO: 0.6 %
BILIRUB SERPL-MCNC: 1 MG/DL (ref 0.1–2)
BUN BLD-MCNC: 11 MG/DL (ref 9–23)
CALCIUM BLD-MCNC: 8.7 MG/DL (ref 8.5–10.1)
CHLORIDE SERPL-SCNC: 102 MMOL/L (ref 98–112)
CHOLEST SERPL-MCNC: 175 MG/DL (ref ?–200)
CO2 SERPL-SCNC: 27 MMOL/L (ref 21–32)
CREAT BLD-MCNC: 1.27 MG/DL
EGFRCR SERPLBLD CKD-EPI 2021: 76 ML/MIN/1.73M2 (ref 60–?)
EOSINOPHIL # BLD AUTO: 0.1 X10(3) UL (ref 0–0.7)
EOSINOPHIL NFR BLD AUTO: 1 %
ERYTHROCYTE [DISTWIDTH] IN BLOOD BY AUTOMATED COUNT: 11.9 %
FASTING PATIENT LIPID ANSWER: YES
FASTING STATUS PATIENT QL REPORTED: YES
GLOBULIN PLAS-MCNC: 3.1 G/DL (ref 2.8–4.4)
GLUCOSE BLD-MCNC: 97 MG/DL (ref 70–99)
HCT VFR BLD AUTO: 47.9 %
HDLC SERPL-MCNC: 34 MG/DL (ref 40–59)
HGB BLD-MCNC: 16.2 G/DL
IMM GRANULOCYTES # BLD AUTO: 0.02 X10(3) UL (ref 0–1)
IMM GRANULOCYTES NFR BLD: 0.2 %
LDLC SERPL CALC-MCNC: 83 MG/DL (ref ?–100)
LYMPHOCYTES # BLD AUTO: 2.61 X10(3) UL (ref 1–4)
LYMPHOCYTES NFR BLD AUTO: 27 %
MCH RBC QN AUTO: 30.4 PG (ref 26–34)
MCHC RBC AUTO-ENTMCNC: 33.8 G/DL (ref 31–37)
MCV RBC AUTO: 89.9 FL
MONOCYTES # BLD AUTO: 0.94 X10(3) UL (ref 0.1–1)
MONOCYTES NFR BLD AUTO: 9.7 %
NEUTROPHILS # BLD AUTO: 5.93 X10 (3) UL (ref 1.5–7.7)
NEUTROPHILS # BLD AUTO: 5.93 X10(3) UL (ref 1.5–7.7)
NEUTROPHILS NFR BLD AUTO: 61.5 %
NONHDLC SERPL-MCNC: 141 MG/DL (ref ?–130)
OSMOLALITY SERPL CALC.SUM OF ELEC: 277 MOSM/KG (ref 275–295)
PLATELET # BLD AUTO: 250 10(3)UL (ref 150–450)
POTASSIUM SERPL-SCNC: 3.9 MMOL/L (ref 3.5–5.1)
PROT SERPL-MCNC: 6.9 G/DL (ref 6.4–8.2)
RBC # BLD AUTO: 5.33 X10(6)UL
SODIUM SERPL-SCNC: 134 MMOL/L (ref 136–145)
TRIGL SERPL-MCNC: 358 MG/DL (ref 30–149)
TSI SER-ACNC: 1.95 MIU/ML (ref 0.36–3.74)
VLDLC SERPL CALC-MCNC: 57 MG/DL (ref 0–30)
WBC # BLD AUTO: 9.7 X10(3) UL (ref 4–11)

## 2024-04-17 PROCEDURE — 36415 COLL VENOUS BLD VENIPUNCTURE: CPT

## 2024-04-17 PROCEDURE — 84443 ASSAY THYROID STIM HORMONE: CPT

## 2024-04-17 PROCEDURE — 80061 LIPID PANEL: CPT

## 2024-04-17 PROCEDURE — 85025 COMPLETE CBC W/AUTO DIFF WBC: CPT

## 2024-04-17 PROCEDURE — 80053 COMPREHEN METABOLIC PANEL: CPT

## 2024-04-23 ENCOUNTER — OFFICE VISIT (OUTPATIENT)
Dept: INTERNAL MEDICINE CLINIC | Facility: CLINIC | Age: 34
End: 2024-04-23
Payer: COMMERCIAL

## 2024-04-23 VITALS
OXYGEN SATURATION: 96 % | SYSTOLIC BLOOD PRESSURE: 122 MMHG | HEIGHT: 73 IN | WEIGHT: 245 LBS | DIASTOLIC BLOOD PRESSURE: 78 MMHG | BODY MASS INDEX: 32.47 KG/M2 | HEART RATE: 75 BPM

## 2024-04-23 DIAGNOSIS — Z00.00 ANNUAL PHYSICAL EXAM: Primary | ICD-10-CM

## 2024-04-23 DIAGNOSIS — Z72.0 TOBACCO USE: ICD-10-CM

## 2024-04-23 PROCEDURE — 96127 BRIEF EMOTIONAL/BEHAV ASSMT: CPT | Performed by: FAMILY MEDICINE

## 2024-04-23 PROCEDURE — 99395 PREV VISIT EST AGE 18-39: CPT | Performed by: FAMILY MEDICINE

## 2024-04-23 PROCEDURE — 3074F SYST BP LT 130 MM HG: CPT | Performed by: FAMILY MEDICINE

## 2024-04-23 PROCEDURE — 3078F DIAST BP <80 MM HG: CPT | Performed by: FAMILY MEDICINE

## 2024-04-23 PROCEDURE — 3008F BODY MASS INDEX DOCD: CPT | Performed by: FAMILY MEDICINE

## 2024-04-23 RX ORDER — BUPROPION HYDROCHLORIDE 150 MG/1
TABLET, EXTENDED RELEASE ORAL
Qty: 180 TABLET | Refills: 0 | Status: SHIPPED | OUTPATIENT
Start: 2024-04-23

## 2024-04-23 RX ORDER — FLUTICASONE PROPIONATE 50 MCG
SPRAY, SUSPENSION (ML) NASAL
Qty: 3 EACH | Refills: 3 | Status: SHIPPED | OUTPATIENT
Start: 2024-04-23

## 2024-04-23 NOTE — PROGRESS NOTES
Subjective:   Patient ID: Nahum Rod is a 33 year old male.    HPI Here for annual check-up. Patient is very physically active at his job.   Wants to quit smoking. Has tried nicotine gum and this hasn't worked.     History/Other:   Past Medical History:    Allergic rhinitis    Anal fissure    Depression    Elevated blood pressure    Hallucinations    History of chicken pox    Maxillary sinus cyst    1.5 cm polyp or retention cyst in Rt maxillary sinus    Mood disorder (HCC)    Psychosis (HCC)     History reviewed. No pertinent surgical history.  Social History     Socioeconomic History    Marital status: Single   Tobacco Use    Smoking status: Every Day     Current packs/day: 0.00     Types: Cigarettes     Last attempt to quit: 2017     Years since quittin.2    Smokeless tobacco: Current   Vaping Use    Vaping status: Never Used   Substance and Sexual Activity    Alcohol use: Not Currently     Comment: 1 X 6 MONTH    Drug use: No     Types: Cannabis   Other Topics Concern    Caffeine Concern No    Exercise No     Family History   Problem Relation Age of Onset    Cancer Paternal Grandmother     Cancer Maternal Grandfather     Lipids Maternal Grandmother         hyperlipidemia    Hypertension Maternal Grandmother     Diabetes Maternal Grandmother     Heart Disorder Maternal Grandmother         atrial fibrillation    Hypertension Father     Thyroid Disorder Father         hypothyroidism    Lipids Father         hyperlipidemia    Diabetes Father     Lung Disorder Father         COPD    Hypertension Mother     Thyroid Disorder Mother         hypothyroidism    Diabetes Mother     Heart Disorder Mother     Hypertension Paternal Grandfather     Lipids Paternal Grandfather         hyperlipidemia    Stroke Paternal Grandfather     Diabetes Paternal Grandfather     Renal Disease Paternal Grandfather         renal failure    Arthritis Paternal Grandfather     Cancer Maternal Grandfather     Thyroid Disorder  Other         hypothyroidism, Siblings    Arthritis Maternal Grandfather     Cancer Sister        Review of Systems   Constitutional:  Negative for chills, fatigue and fever.   HENT:  Negative for hearing loss and sore throat.    Eyes:  Negative for pain and visual disturbance.   Respiratory:  Negative for choking, shortness of breath and wheezing.    Cardiovascular:  Negative for chest pain, palpitations and leg swelling.   Gastrointestinal:  Negative for abdominal pain, constipation, diarrhea, nausea and vomiting.   Endocrine: Negative for polydipsia, polyphagia and polyuria.   Genitourinary:  Negative for dysuria.   Musculoskeletal:  Negative for arthralgias and joint swelling.   Skin:  Negative for rash.   Neurological:  Negative for dizziness, weakness, light-headedness, numbness and headaches.   Hematological:  Negative for adenopathy. Does not bruise/bleed easily.   Psychiatric/Behavioral:  Negative for dysphoric mood. The patient is not nervous/anxious.      Current Outpatient Medications   Medication Sig Dispense Refill    fluticasone propionate 50 MCG/ACT Nasal Suspension USE TWO SPRAY(S) IN EACH NOSTRIL ONCE DAILY 3 each 3    buPROPion  MG Oral Tablet 12 Hr One tab PO daily x 3 days, then one tab PO BID. Stop smoking on day 8. 180 tablet 0    Fexofenadine HCl (ALLEGRA OR)        Allergies:No Known Allergies    Objective:   Physical Exam  Vitals reviewed.   Constitutional:       Appearance: Normal appearance. He is well-developed.   HENT:      Head: Normocephalic and atraumatic.      Right Ear: Tympanic membrane, ear canal and external ear normal.      Left Ear: Tympanic membrane, ear canal and external ear normal.      Mouth/Throat:      Pharynx: No posterior oropharyngeal erythema.   Eyes:      Conjunctiva/sclera: Conjunctivae normal.   Cardiovascular:      Rate and Rhythm: Normal rate and regular rhythm.      Heart sounds: Normal heart sounds.   Pulmonary:      Effort: Pulmonary effort is normal.       Breath sounds: Normal breath sounds.   Musculoskeletal:      Cervical back: Normal range of motion and neck supple.   Skin:     General: Skin is warm and dry.   Neurological:      Mental Status: He is alert and oriented to person, place, and time.   Psychiatric:         Behavior: Behavior normal.         Assessment & Plan:   1. Annual physical exam    2. Tobacco use    Reviewed age-appropriate preventive health and safety recommendations with patient. Reviewed lab results. Encouraged regular exercise and healthy eating.   Start bupropion. Discussed risks/benefits/potential side effects and proper use of medication. Take for at least 90 days.       No orders of the defined types were placed in this encounter.      Meds This Visit:  Requested Prescriptions     Signed Prescriptions Disp Refills    fluticasone propionate 50 MCG/ACT Nasal Suspension 3 each 3     Sig: USE TWO SPRAY(S) IN EACH NOSTRIL ONCE DAILY    buPROPion  MG Oral Tablet 12 Hr 180 tablet 0     Sig: One tab PO daily x 3 days, then one tab PO BID. Stop smoking on day 8.       Imaging & Referrals:  None

## 2024-05-02 ENCOUNTER — OFFICE VISIT (OUTPATIENT)
Dept: FAMILY MEDICINE CLINIC | Facility: CLINIC | Age: 34
End: 2024-05-02
Payer: COMMERCIAL

## 2024-05-02 VITALS
BODY MASS INDEX: 32.51 KG/M2 | HEIGHT: 72 IN | DIASTOLIC BLOOD PRESSURE: 73 MMHG | OXYGEN SATURATION: 97 % | HEART RATE: 71 BPM | SYSTOLIC BLOOD PRESSURE: 134 MMHG | WEIGHT: 240 LBS | RESPIRATION RATE: 18 BRPM | TEMPERATURE: 97 F

## 2024-05-02 DIAGNOSIS — J06.9 URI WITH COUGH AND CONGESTION: Primary | ICD-10-CM

## 2024-05-02 DIAGNOSIS — J02.9 SORE THROAT: ICD-10-CM

## 2024-05-02 LAB
CONTROL LINE PRESENT WITH A CLEAR BACKGROUND (YES/NO): YES YES/NO
KIT LOT #: NORMAL NUMERIC

## 2024-05-02 PROCEDURE — 3008F BODY MASS INDEX DOCD: CPT | Performed by: NURSE PRACTITIONER

## 2024-05-02 PROCEDURE — 3075F SYST BP GE 130 - 139MM HG: CPT | Performed by: NURSE PRACTITIONER

## 2024-05-02 PROCEDURE — 99213 OFFICE O/P EST LOW 20 MIN: CPT | Performed by: NURSE PRACTITIONER

## 2024-05-02 PROCEDURE — 3078F DIAST BP <80 MM HG: CPT | Performed by: NURSE PRACTITIONER

## 2024-05-02 PROCEDURE — 87880 STREP A ASSAY W/OPTIC: CPT | Performed by: NURSE PRACTITIONER

## 2024-05-02 RX ORDER — BENZONATATE 200 MG/1
200 CAPSULE ORAL 3 TIMES DAILY PRN
Qty: 21 CAPSULE | Refills: 0 | Status: SHIPPED | OUTPATIENT
Start: 2024-05-02

## 2024-05-02 NOTE — PATIENT INSTRUCTIONS
Tylenol and Motrin as needed  Rest, push fluids  Mucinex over the counter for nasal congestion  Tessalon as prescribed for cough, use as needed  Continue daily Allegra and Flonase   Return to care for new/worsening symptoms or if symptoms persist for 2+ weeks without any improvement

## 2024-05-02 NOTE — PROGRESS NOTES
Subjective:   Patient ID: Nahum Rod is a 33 year old male.    Patient is a 33 year old male who presents today with bilateral ear pain (left worse than right), nasal congestion, runny nose, cough, pnd, sore throat, occasional nausea and tension headaches x 3 days. Patient recently started Wellbutrin for smoking cessation and is not sure if some symptoms may be due to this. Has not smoked a cigarette in 2 days. Denies fevers, body aches, chills, SOB, wheezing, vomiting, diarrhea, abdominal pain, drainage from ears or rashes. Tolerating PO well at home. Attempted treatment prior to arrival = none. Mom, dad and brother all with URI's recently.        History/Other:   Review of Systems   Constitutional:  Negative for appetite change, chills and fever.   HENT:  Positive for congestion, ear pain, postnasal drip, rhinorrhea and sore throat. Negative for ear discharge.    Respiratory:  Positive for cough. Negative for shortness of breath and wheezing.    Gastrointestinal:  Positive for nausea. Negative for abdominal pain, diarrhea and vomiting.   Musculoskeletal:  Negative for myalgias.   Skin:  Negative for rash.   Neurological:  Positive for headaches.     Current Outpatient Medications   Medication Sig Dispense Refill    benzonatate 200 MG Oral Cap Take 1 capsule (200 mg total) by mouth 3 (three) times daily as needed for cough. 21 capsule 0    fluticasone propionate 50 MCG/ACT Nasal Suspension USE TWO SPRAY(S) IN EACH NOSTRIL ONCE DAILY 3 each 3    buPROPion  MG Oral Tablet 12 Hr One tab PO daily x 3 days, then one tab PO BID. Stop smoking on day 8. 180 tablet 0    Fexofenadine HCl (ALLEGRA OR)        Allergies:No Known Allergies    /73   Pulse 71   Temp 97 °F (36.1 °C)   Resp 18   Ht 6' (1.829 m)   Wt 240 lb (108.9 kg)   SpO2 97%   BMI 32.55 kg/m²       Objective:   Physical Exam  Vitals reviewed.   Constitutional:       General: He is awake. He is not in acute distress.     Appearance:  Normal appearance. He is well-developed and well-groomed. He is not ill-appearing, toxic-appearing or diaphoretic.   HENT:      Head: Normocephalic and atraumatic.      Right Ear: Tympanic membrane, ear canal and external ear normal.      Left Ear: Tympanic membrane, ear canal and external ear normal.      Nose: Nose normal.      Mouth/Throat:      Lips: Pink.      Mouth: Mucous membranes are moist. No oral lesions.      Pharynx: Oropharynx is clear. Uvula midline. Posterior oropharyngeal erythema present. No pharyngeal swelling, oropharyngeal exudate or uvula swelling (erythema).   Cardiovascular:      Rate and Rhythm: Normal rate and regular rhythm.   Pulmonary:      Effort: Pulmonary effort is normal. No respiratory distress.      Breath sounds: Normal breath sounds and air entry. No decreased breath sounds, wheezing, rhonchi or rales.   Lymphadenopathy:      Cervical: No cervical adenopathy.   Skin:     General: Skin is warm and dry.   Neurological:      Mental Status: He is alert and oriented to person, place, and time.   Psychiatric:         Behavior: Behavior is cooperative.         Assessment & Plan:   1. URI with cough and congestion    2. Sore throat        Orders Placed This Encounter   Procedures    Rapid Strep     Results for orders placed or performed in visit on 05/02/24   Rapid Strep    Collection Time: 05/02/24  1:38 PM   Result Value Ref Range    Strep Grp A Screen neg Negative    Control Line Present with a clear background (yes/no) yes Yes/No    Kit Lot # [id number] Numeric    Kit Expiration Date 5/21/25 Date         Meds This Visit:  Requested Prescriptions     Signed Prescriptions Disp Refills    benzonatate 200 MG Oral Cap 21 capsule 0     Sig: Take 1 capsule (200 mg total) by mouth 3 (three) times daily as needed for cough.     Reviewed POC test results with patient.  Reassuring physical exam findings. Vitals WNL. No sign of bacterial etiology, RDS or dehydration at this time.  Tessalon as  prescribed for cough.  Supportive care and return to care measures reviewed.  Patient v/u and is comfortable with this plan.    Patient Instructions   Tylenol and Motrin as needed  Rest, push fluids  Mucinex over the counter for nasal congestion  Tessalon as prescribed for cough, use as needed  Continue daily Allegra and Flonase   Return to care for new/worsening symptoms or if symptoms persist for 2+ weeks without any improvement

## 2024-08-07 ENCOUNTER — OFFICE VISIT (OUTPATIENT)
Dept: FAMILY MEDICINE CLINIC | Facility: CLINIC | Age: 34
End: 2024-08-07
Payer: COMMERCIAL

## 2024-08-07 VITALS
TEMPERATURE: 99 F | HEIGHT: 73 IN | DIASTOLIC BLOOD PRESSURE: 88 MMHG | OXYGEN SATURATION: 97 % | HEART RATE: 69 BPM | BODY MASS INDEX: 32.47 KG/M2 | RESPIRATION RATE: 18 BRPM | WEIGHT: 245 LBS | SYSTOLIC BLOOD PRESSURE: 122 MMHG

## 2024-08-07 DIAGNOSIS — L73.9 FOLLICULITIS: Primary | ICD-10-CM

## 2024-08-07 PROCEDURE — 3074F SYST BP LT 130 MM HG: CPT | Performed by: FAMILY MEDICINE

## 2024-08-07 PROCEDURE — 3008F BODY MASS INDEX DOCD: CPT | Performed by: FAMILY MEDICINE

## 2024-08-07 PROCEDURE — 3079F DIAST BP 80-89 MM HG: CPT | Performed by: FAMILY MEDICINE

## 2024-08-07 PROCEDURE — 99213 OFFICE O/P EST LOW 20 MIN: CPT | Performed by: FAMILY MEDICINE

## 2024-08-07 RX ORDER — CEPHALEXIN 500 MG/1
500 CAPSULE ORAL 2 TIMES DAILY
Qty: 14 CAPSULE | Refills: 0 | Status: SHIPPED | OUTPATIENT
Start: 2024-08-07 | End: 2024-08-14

## 2024-08-07 NOTE — PROGRESS NOTES
CHIEF COMPLAINT:     Chief Complaint   Patient presents with    Rash     Rash  or possible bug bite on left foot. Pt notice it today (itchy and painful )        HPI:     Nahum Rod is a 33 year old male who presents with concerns of itchy, painful rash on top of left foot. Patient first noticed symptoms today after finishing nightshift. Reports erythema, increased warmth, some tenderness to palpation. Denies drainage from area.  Symptoms have been persistent since onset.  Affected location includes: dorsal surface of left foot- centrally.      Precipitating event: none known. Pt does note that her works 12 hour shifts at the [employer] and yesterday his feet got more wet than usual.  Treatments: none.  No other associated symptoms.  Denies fever, streaking of wound, or other signs of systemic illness.       Current Outpatient Medications   Medication Sig Dispense Refill    cephalexin (KEFLEX) 500 MG Oral Cap Take 1 capsule (500 mg total) by mouth 2 (two) times daily for 7 days. 14 capsule 0    mupirocin 2 % External Ointment Apply 1 Application topically 2 (two) times daily. 30 g 0    fluticasone propionate 50 MCG/ACT Nasal Suspension USE TWO SPRAY(S) IN EACH NOSTRIL ONCE DAILY 3 each 3    buPROPion  MG Oral Tablet 12 Hr One tab PO daily x 3 days, then one tab PO BID. Stop smoking on day 8. 180 tablet 0    Fexofenadine HCl (ALLEGRA OR)       benzonatate 200 MG Oral Cap Take 1 capsule (200 mg total) by mouth 3 (three) times daily as needed for cough. (Patient not taking: Reported on 8/7/2024) 21 capsule 0     No current facility-administered medications for this visit.      Past Medical History:    Allergic rhinitis    Anal fissure    Depression    Elevated blood pressure    Hallucinations    History of chicken pox    Maxillary sinus cyst    1.5 cm polyp or retention cyst in Rt maxillary sinus    Mood disorder (HCC)    Psychosis (HCC)      Social History:  Social History     Socioeconomic History     Marital status: Single   Tobacco Use    Smoking status: Every Day     Current packs/day: 0.00     Types: Cigarettes     Last attempt to quit: 2017     Years since quittin.5    Smokeless tobacco: Current   Vaping Use    Vaping status: Never Used   Substance and Sexual Activity    Alcohol use: Not Currently     Comment: 1 X 6 MONTH    Drug use: No     Types: Cannabis   Other Topics Concern    Caffeine Concern No    Exercise No        REVIEW OF SYSTEMS:   GENERAL: feels well otherwise, no fever, no chills.  SKIN: as above.  CHEST: no chest pains, no palpitations.  LUNGS: denies shortness of breath with exertion or rest. No wheezing, no cough.  LYMPH: no enlargement of the lymph nodes.  MUSC/SKEL: no joint swelling, no joint stiffness.  NEURO: no abnormal sensation, no tingling of the skin or numbness.    EXAM:   /88   Pulse 69   Temp 98.6 °F (37 °C) (Temporal)   Resp 18   Ht 6' 1\" (1.854 m)   Wt 245 lb (111.1 kg)   SpO2 97%   BMI 32.32 kg/m²   GENERAL: well developed, well nourished,in no apparent distress  SKIN: dorsal surface of left foot with several small papular eruptions over hair-bearing skin areas. There are a few lesions with fluid or pus-filled appearance. There are also several pinpoint erythematous papules surrounding hair follicles on b/l upper thighs  LUNGS: clear to auscultation bilaterally, no wheezes or rhonchi. Breathing is non labored.  CARDIO: RRR without murmur  EXTREMITIES: no cyanosis, clubbing or edema.  Cap refill brisk- less than 2 seconds.   LYMPH: no lymphadenopathy.      ASSESSMENT AND PLAN:     ASSESSMENT:  Encounter Diagnosis   Name Primary?    Folliculitis Yes       PLAN: Skin care discussed with patient. Instructions and Comfort Care as listed in Patient Instructions.  Medication as below.    Requested Prescriptions     Signed Prescriptions Disp Refills    cephalexin (KEFLEX) 500 MG Oral Cap 14 capsule 0     Sig: Take 1 capsule (500 mg total) by mouth 2 (two) times  daily for 7 days.    mupirocin 2 % External Ointment 30 g 0     Sig: Apply 1 Application topically 2 (two) times daily.       Patient Instructions   Take antibiotics with food and plenty of water.   Eat yogurt or take probiotic daily. (Align is a good example of an OTC probiotic)  Make sure to finish the entire antibiotic treatment.  Increase fluids and rest.   Apply mupirocin to affected area twice daily.   Keep area clean and dry as much as possible.   Monitor symptoms and contact the office if no better in 2-3 days.      The patient indicates understanding of these issues and agrees to the plan.

## 2024-08-07 NOTE — PATIENT INSTRUCTIONS
Take antibiotics with food and plenty of water.   Eat yogurt or take probiotic daily. (Align is a good example of an OTC probiotic)  Make sure to finish the entire antibiotic treatment.  Increase fluids and rest.   Apply mupirocin to affected area twice daily.   Keep area clean and dry as much as possible.   Monitor symptoms and contact the office if no better in 2-3 days.

## 2024-08-08 RX ORDER — BUPROPION HYDROCHLORIDE 150 MG/1
150 TABLET, EXTENDED RELEASE ORAL 2 TIMES DAILY
Qty: 180 TABLET | Refills: 0 | Status: SHIPPED | OUTPATIENT
Start: 2024-08-08

## 2024-08-08 NOTE — TELEPHONE ENCOUNTER
Refill passed per [facility] protocol.    Please review last office visit  04/23/2024     last refill 04/23/2024    Is refill appropriate?     Requested Prescriptions   Pending Prescriptions Disp Refills    BUPROPION  MG Oral Tablet 12 Hr [Pharmacy Med Name: buPROPion HCl ER (SR) Oral Tablet Extended Release 12 Hour 150 MG] 180 tablet 0     Sig: take 1 tablet by mouth daily for 3 days then take 1 tablet by mouth 2 times daily. stop smoking on day 8       Psychiatric Non-Scheduled (Anti-Anxiety) Passed - 8/4/2024  4:36 PM        Passed - In person appointment or virtual visit in the past 6 mos or appointment in next 3 mos     Recent Outpatient Visits              Yesterday Folliculitis    [facility], [address] [place] [address] Shabnam Scott PA-C    Office Visit    3 months ago URI with cough and congestion    [facility], [address] [place] [address] Tanesha Torres APRN    Office Visit    3 months ago Annual physical exam    [facility], [address] [place] [address] Yojana Esqueda DO    Office Visit    1 year ago Conjunctivitis of left eye, unspecified conjunctivitis type    [facility], [address] [place] [address] Yojana Esqueda DO    Office Visit    1 year ago Skin pimple    [facility], [address] [place] [address] Yojana Esqueda DO    Office Visit                      Passed - Depression Screening completed within the past 12 months           Recent Outpatient Visits              Yesterday Folliculitis    [facility], [address] [place] [address] Shabnam Scott PA-C    Office Visit    3 months ago URI with cough and congestion    [facility], [address] [place] [address] Tanesha Torres, APRN    Office Visit    3 months ago Annual physical exam    [facility], [address] [place] [address], Yojana Esqueda DO     Office Visit    1 year ago Conjunctivitis of left eye, unspecified conjunctivitis type    [facility], [address] [place] [address], Yojana Esqueda,     Office Visit    1 year ago Skin pimple    [facility], [address] [place] [address], Yojana Esqueda, DO    Office Visit

## 2024-09-03 ENCOUNTER — APPOINTMENT (OUTPATIENT)
Dept: GENERAL RADIOLOGY | Age: 34
End: 2024-09-03
Attending: PHYSICIAN ASSISTANT
Payer: COMMERCIAL

## 2024-09-03 ENCOUNTER — HOSPITAL ENCOUNTER (OUTPATIENT)
Age: 34
Discharge: HOME | End: 2024-09-03
Payer: COMMERCIAL

## 2024-09-03 VITALS
HEART RATE: 82 BPM | TEMPERATURE: 98 F | OXYGEN SATURATION: 98 % | WEIGHT: 242 LBS | HEIGHT: 73 IN | BODY MASS INDEX: 32.07 KG/M2 | DIASTOLIC BLOOD PRESSURE: 83 MMHG | SYSTOLIC BLOOD PRESSURE: 131 MMHG | RESPIRATION RATE: 24 BRPM

## 2024-09-03 DIAGNOSIS — R07.9 ACUTE CHEST PAIN: Primary | ICD-10-CM

## 2024-09-03 LAB
#MXD IC: 0.5 X10ˆ3/UL (ref 0.1–1)
BUN BLD-MCNC: 10 MG/DL (ref 7–18)
CHLORIDE BLD-SCNC: 104 MMOL/L (ref 98–112)
CO2 BLD-SCNC: 23 MMOL/L (ref 21–32)
CREAT BLD-MCNC: 1.1 MG/DL
EGFRCR SERPLBLD CKD-EPI 2021: 90 ML/MIN/1.73M2 (ref 60–?)
GLUCOSE BLD-MCNC: 111 MG/DL (ref 70–99)
HCT VFR BLD AUTO: 42.5 %
HCT VFR BLD CALC: 43 %
HGB BLD-MCNC: 14.1 G/DL
ISTAT IONIZED CALCIUM FOR CHEM 8: 1.14 MMOL/L (ref 1.12–1.32)
LYMPHOCYTES # BLD AUTO: 1.4 X10ˆ3/UL (ref 1–4)
LYMPHOCYTES NFR BLD AUTO: 20.2 %
MCH RBC QN AUTO: 29.3 PG (ref 26–34)
MCHC RBC AUTO-ENTMCNC: 33.2 G/DL (ref 31–37)
MCV RBC AUTO: 88.2 FL (ref 80–100)
MIXED CELL %: 7 %
NEUTROPHILS # BLD AUTO: 5 X10ˆ3/UL (ref 1.5–7.7)
NEUTROPHILS NFR BLD AUTO: 72.8 %
PLATELET # BLD AUTO: 254 X10ˆ3/UL (ref 150–450)
POTASSIUM BLD-SCNC: 3.8 MMOL/L (ref 3.6–5.1)
RBC # BLD AUTO: 4.82 X10ˆ6/UL
SODIUM BLD-SCNC: 139 MMOL/L (ref 136–145)
TROPONIN I BLD-MCNC: <0.02 NG/ML
WBC # BLD AUTO: 6.9 X10ˆ3/UL (ref 4–11)

## 2024-09-03 PROCEDURE — 93000 ELECTROCARDIOGRAM COMPLETE: CPT | Performed by: PHYSICIAN ASSISTANT

## 2024-09-03 PROCEDURE — 99213 OFFICE O/P EST LOW 20 MIN: CPT | Performed by: PHYSICIAN ASSISTANT

## 2024-09-03 PROCEDURE — 84484 ASSAY OF TROPONIN QUANT: CPT | Performed by: PHYSICIAN ASSISTANT

## 2024-09-03 PROCEDURE — 85025 COMPLETE CBC W/AUTO DIFF WBC: CPT | Performed by: PHYSICIAN ASSISTANT

## 2024-09-03 PROCEDURE — 80047 BASIC METABLC PNL IONIZED CA: CPT | Performed by: PHYSICIAN ASSISTANT

## 2024-09-03 PROCEDURE — 71046 X-RAY EXAM CHEST 2 VIEWS: CPT | Performed by: PHYSICIAN ASSISTANT

## 2024-09-03 NOTE — ED PROVIDER NOTES
Patient Seen in: [facility]      History     Chief Complaint   Patient presents with    Chest Pain     Entered by patient     Stated Complaint: Chest Pain x 3 weeks    Subjective:   HPI    Patient is a 33-year-old male who presents to immediate care due to intermittent chest pain x 3 weeks.  Patient states this morning he accidentally took his evening dose of 150mg Wellbutrin by mistake.  States shortly afterwards he began to have chest pain shortness of breath that has been rapidly improving.  Last episode of chest pain occurred a few hours prior to arrival.  Denies history of PE DVT, lower leg edema,  recent cough or fever.  Denies prior MI, cardiac history.    Objective:   Past Medical History:    Allergic rhinitis    Anal fissure    Depression    Elevated blood pressure    Hallucinations    History of chicken pox    Maxillary sinus cyst    1.5 cm polyp or retention cyst in Rt maxillary sinus    Mood disorder (HCC)    Psychosis (HCC)              History reviewed. No pertinent surgical history.             Social History     Socioeconomic History    Marital status: Single   Tobacco Use    Smoking status: Former     Current packs/day: 0.00     Types: Cigarettes     Quit date: 2017     Years since quittin.5     Passive exposure: Current    Smokeless tobacco: Former   Vaping Use    Vaping status: Never Used   Substance and Sexual Activity    Alcohol use: Not Currently     Comment: 1 X 6 MONTH    Drug use: No     Types: Cannabis   Other Topics Concern    Caffeine Concern No    Exercise No              Review of Systems    Positive for stated Chief Complaint: Chest Pain (Entered by patient)    Other systems are as noted in HPI.  Constitutional and vital signs reviewed.      All other systems reviewed and negative except as noted above.    Physical Exam     ED Triage Vitals [24 1147]   /83   Pulse 82   Resp 24   Temp 98.3 °F (36.8 °C)   Temp src Temporal   SpO2 98 %   O2  Device None (Room air)       Current Vitals:   Vital Signs  BP: 131/83  Pulse: 82  Resp: 24  Temp: 98.3 °F (36.8 °C)  Temp src: Temporal    Oxygen Therapy  SpO2: 98 %  O2 Device: None (Room air)            Physical Exam    Vital signs reviewed. Nursing note reviewed.  Constitutional: Well-developed. Well-nourished. In no acute distress  HENT: Mucous membranes moist.   EYES: No scleral icterus or conjunctival injection.  NECK: Full ROM. Supple.   CARDIAC: Normal rate. Normal S1/ S2. 2+ distal pulses. No edema  PULM/CHEST: Clear to auscultation bilaterally. No wheezes  Extremities: Full ROM  NEURO: Awake, alert, following commands, moving extremities, answering questions.   SKIN: Warm and dry. No rash or lesions.  PSYCH: Normal judgment. Normal affect.        ED Course     Labs Reviewed   POCT CBC     EKG    Rate, intervals and axes as noted on EKG Report.  Rate: 66bpm  Rhythm: Sinus Rhythm  Reading: nsr, no ectopy, no st segment changes, no signs of acute ischemia                          MDM      Patient is a 33-year-old male who presents to immediate care due to chest pain intermittently over the past 3 weeks.  Patient arrives with stable vitals speaking complete sentences in no respiratory distress.  Physical exam unremarkable with lungs clear to auscultation no lower leg edema.  EKG chest x-ray lab work including CBC BMP troponin ordered.  Differential diagnosis include anxiety state, ACS, less likely PE as patient is PERC negative.  History given by patient.  Care discussed with attending Dr. Christian          1:25 PM  Discussed reassuring labs and imaging with patient.  Chest x-ray clear showing no consolidation, personally reviewed by me.  Lab work unremarkable negative troponin no electrolyte disturbances, no leukocytosis.  Low heart score for M ACE.  Encourage patient to follow-up with PCP regarding medication management.  ED precautions discussed including return of chest pain, worsening chest pain shortness  of breath.  Patient agreeable to plan all questions answered.                         Medical Decision Making      Disposition and Plan     Clinical Impression:  1. Acute chest pain         Disposition:  There is no disposition on file for this visit.  There is no disposition time on file for this visit.    Follow-up:  No follow-up provider specified.        Medications Prescribed:  Current Discharge Medication List

## 2024-09-03 NOTE — ED INITIAL ASSESSMENT (HPI)
Pt took an extra dose of bupropion by accident today and now is having chest pain, and is anxious, irritable and was placed on it for stopping smoking

## 2024-09-04 ENCOUNTER — NURSE TRIAGE (OUTPATIENT)
Dept: INTERNAL MEDICINE CLINIC | Facility: CLINIC | Age: 34
End: 2024-09-04

## 2024-09-04 LAB
ATRIAL RATE: 66 BPM
P AXIS: 27 DEGREES
P-R INTERVAL: 154 MS
Q-T INTERVAL: 374 MS
QRS DURATION: 100 MS
QTC CALCULATION (BEZET): 392 MS
R AXIS: 32 DEGREES
T AXIS: 38 DEGREES
VENTRICULAR RATE: 66 BPM

## 2024-09-04 NOTE — TELEPHONE ENCOUNTER
Reason for Disposition   Panic attack symptoms (e.g., sudden onset of intense fear and symptoms such as dizziness, feeling of impending doom or fear of dying, hyperventilation, numbness or tingling, sweating, trembling), and has not been evaluated for this by doctor (or NP/PA)    Protocols used: Anxiety and Panic Attack-A-[place]    Action Requested: Summary for Provider     []  Critical Lab, Recommendations Needed  [x] Need Additional Advice  []   FYI    []   Need Orders  [] Need Medications Sent to Pharmacy  []  Other     SUMMARY: Pt called. Stated he went to  yesterday as he was having symptoms which he believes is attributed to med side effect and wanted to make sure nothing serious was going on. Pt stated he accidentally took 2 tabs of Bupropion yesterday (300mg) and looked up online what he should do. While looking at side effects pt stated he has had all those side effects since starting med months ago. Stated symptoms he has are sore throat, hoarse voice, difficulty urinating, irritability, and shaking. Pt stated today the shaking is so bad, can't hold things. Denies numbness. Pt stated his job is very physical and unsure what to do. Hasn't taken Bupropion yet today. Does state he is having increased stress and anxiety in his life which he believes are \"exacerbating\" these side effects. Stated tremors have been for one month but worse today. Denies chest pain and headache. C/o SOB, but states he feels like he is healthy and has a physical job and keeps up with his daughter, but described SOB as \"feels like when you smoke too much in one day\". Supposed to go to work in a few hours, works overnight, heavy lifting required.     Reason for call: Tremors  Onset: Data Unavailable      Future Appointments   Date Time Provider Department Center   9/6/2024  1:00 PM Yojana Pedroza, DO [facility]     [facility] - I scheduled soonest apt with you. Symptoms are all over the place. Do you want me to send him to the ER  today for eval and keep apt Friday for follow up? Pt also wants STI testing on Friday

## 2024-09-06 ENCOUNTER — LAB ENCOUNTER (OUTPATIENT)
Dept: LAB | Age: 34
End: 2024-09-06
Attending: FAMILY MEDICINE
Payer: COMMERCIAL

## 2024-09-06 DIAGNOSIS — Z11.3 SCREENING EXAMINATION FOR STI: ICD-10-CM

## 2024-09-06 LAB
HBV SURFACE AB SER QL: REACTIVE
HBV SURFACE AB SERPL IA-ACNC: >1000 MIU/ML
HBV SURFACE AG SER-ACNC: <0.1 [IU]/L
HBV SURFACE AG SERPL QL IA: NONREACTIVE
T PALLIDUM AB SER QL IA: NONREACTIVE

## 2024-09-06 PROCEDURE — 87591 N.GONORRHOEAE DNA AMP PROB: CPT

## 2024-09-06 PROCEDURE — 87389 HIV-1 AG W/HIV-1&-2 AB AG IA: CPT

## 2024-09-06 PROCEDURE — 87491 CHLMYD TRACH DNA AMP PROBE: CPT

## 2024-09-06 PROCEDURE — 86706 HEP B SURFACE ANTIBODY: CPT

## 2024-09-06 PROCEDURE — 87340 HEPATITIS B SURFACE AG IA: CPT

## 2024-09-06 PROCEDURE — 86780 TREPONEMA PALLIDUM: CPT

## 2024-09-06 PROCEDURE — 36415 COLL VENOUS BLD VENIPUNCTURE: CPT

## 2024-09-08 ENCOUNTER — OFFICE VISIT (OUTPATIENT)
Dept: FAMILY MEDICINE CLINIC | Facility: CLINIC | Age: 34
End: 2024-09-08
Payer: COMMERCIAL

## 2024-09-08 VITALS
RESPIRATION RATE: 18 BRPM | HEIGHT: 73 IN | DIASTOLIC BLOOD PRESSURE: 80 MMHG | SYSTOLIC BLOOD PRESSURE: 110 MMHG | TEMPERATURE: 98 F | BODY MASS INDEX: 31.81 KG/M2 | HEART RATE: 91 BPM | WEIGHT: 240 LBS | OXYGEN SATURATION: 98 %

## 2024-09-08 DIAGNOSIS — T14.8XXA BLISTER: Primary | ICD-10-CM

## 2024-09-08 PROCEDURE — 3074F SYST BP LT 130 MM HG: CPT | Performed by: NURSE PRACTITIONER

## 2024-09-08 PROCEDURE — 3008F BODY MASS INDEX DOCD: CPT | Performed by: NURSE PRACTITIONER

## 2024-09-08 PROCEDURE — 99213 OFFICE O/P EST LOW 20 MIN: CPT | Performed by: NURSE PRACTITIONER

## 2024-09-08 PROCEDURE — 3079F DIAST BP 80-89 MM HG: CPT | Performed by: NURSE PRACTITIONER

## 2024-09-08 RX ORDER — SILVER SULFADIAZINE 10 MG/G
CREAM TOPICAL
Qty: 20 G | Refills: 0 | Status: SHIPPED | OUTPATIENT
Start: 2024-09-08

## 2024-09-08 NOTE — PROGRESS NOTES
CHIEF COMPLAINT:     Chief Complaint   Patient presents with    Other     Small blister on left palm . For 3 days        HPI:     Nahum Rod is a 33 year old male who presents with concerns of possible skin infection. Patient first noticed symptoms 3 days ago.  Reports erythema, some tenderness to palpation, and no drainage from area.  Symptoms have been same since onset.  Affected location includes: left palm.      Precipitating event: blister from opening bottle.  Treatments: soap and water.  No other associated symptoms.  Denies fever, streaking of wound, or other signs of systemic illness.   Would like something to put on for pain    Current Outpatient Medications   Medication Sig Dispense Refill    silver sulfADIAZINE 1 % External Cream Apply twice a day to left hand until healed 20 g 0    busPIRone 5 MG Oral Tab Take 1 tablet (5 mg total) by mouth 3 (three) times daily. 90 tablet 0    mupirocin 2 % External Ointment Apply 1 Application topically 2 (two) times daily. 30 g 0    fluticasone propionate 50 MCG/ACT Nasal Suspension USE TWO SPRAY(S) IN EACH NOSTRIL ONCE DAILY 3 each 3    Fexofenadine HCl (ALLEGRA OR)         Past Medical History:    Allergic rhinitis    Anal fissure    Depression    Elevated blood pressure    Hallucinations    History of chicken pox    Maxillary sinus cyst    1.5 cm polyp or retention cyst in Rt maxillary sinus    Mood disorder (HCC)    Psychosis (HCC)      Social History:  Social History     Socioeconomic History    Marital status: Single   Tobacco Use    Smoking status: Former     Current packs/day: 0.00     Types: Cigarettes     Quit date: 2017     Years since quittin.6     Passive exposure: Current    Smokeless tobacco: Former   Vaping Use    Vaping status: Never Used   Substance and Sexual Activity    Alcohol use: Not Currently     Comment: 1 X 6 MONTH    Drug use: No     Types: Cannabis   Other Topics Concern    Caffeine Concern No    Exercise No     Social  Determinants of Health      Received from [facility]    Housing Stability        REVIEW OF SYSTEMS:   GENERAL: feels well otherwise, no fever, no chills, good appetite  SKIN: as above.  CHEST: no chest pains, no palpitations.  LUNGS: denies shortness of breath with exertion or rest. No wheezing, no cough.  LYMPH: no enlargement of the lymph nodes.  MUSC/SKEL: no joint swelling, no joint stiffness.  CARDIOVASCULAR: denies chest pain on exertion or rest.  GI: no nausea, no vomiting or abdominal pain  NEURO: no abnormal sensation, no tingling of the skin or numbness.    EXAM:   /80   Pulse 91   Temp 97.6 °F (36.4 °C)   Resp 18   Ht 6' 1\" (1.854 m)   Wt 240 lb (108.9 kg)   SpO2 98%   BMI 31.66 kg/m²   GENERAL: well developed, well nourished,in no apparent distress  SKIN: Lesion(s): located left palm with circular blister with 1st layer of skin removed;  + erythema, mild tenderness, no increased warmth, no fluctuance, no drainage   HEAD: atraumatic, normocephalic  EYES: conjunctiva clear, EOM intact  NOSE: Normal external nose.  No rhinorrhea.  MOUTH: Moist oral mucosa. No lesions.   NECK: supple, non-tender  LUNGS: clear to auscultation bilaterally, no wheezes or rhonchi. Breathing is non labored.  CARDIO: RRR without murmur  EXTREMITIES: no cyanosis, clubbing or edema.  Cap refill brisk- less than 2 seconds.   LYMPH: no lymphadenopathy.      ASSESSMENT AND PLAN:     ASSESSMENT:   Encounter Diagnosis   Name Primary?    Blister Yes     1. Blister  - silver sulfADIAZINE 1 % External Cream; Apply twice a day to left hand until healed  Dispense: 20 g; Refill: 0    See pt handout    PLAN: Skin care discussed with patient. Instructions and Comfort Care as listed in Patient Instructions.  Medication as below.    Requested Prescriptions     Signed Prescriptions Disp Refills    silver sulfADIAZINE 1 % External Cream 20 g 0     Sig: Apply twice a day to left hand until healed     Risks, benefits,  and side effects of medication explained and discussed.    See pt handout    The patient indicates understanding of these issues and agrees to the plan.  The patient is asked to follow with PCP in 3 days if sx's persist or worsen.

## 2024-09-09 LAB
C TRACH DNA SPEC QL NAA+PROBE: NEGATIVE
N GONORRHOEA DNA SPEC QL NAA+PROBE: NEGATIVE

## 2024-09-26 ENCOUNTER — TELEPHONE (OUTPATIENT)
Dept: INTERNAL MEDICINE CLINIC | Facility: CLINIC | Age: 34
End: 2024-09-26

## 2024-09-26 NOTE — TELEPHONE ENCOUNTER
LOV 9/6/24     Pt called. Stated he believes he is withdrawing from medication. Stated he stopped taking the Buspirone 5 days ago because it was causing him to have anger problems. Pt stated he does not want to take any medications that alter his brain chemistry. Pt stated his anxiety has gone away. Seeing a therapist. Reason he believes he may be withdrawing from medication is because he feels like his \"brain is melting\". Pt also described it as \"a headache that doesn't hurt\". Pt stated he feels like this is internal. Stated he would rather have a headache that this feeling. Pt could not describe in any other way. Denies any other symptoms.     Routing to [facility] for review and advice. Do you think this could be withdrawal? Stopped taking medication about 5 days ago.

## 2024-09-27 NOTE — TELEPHONE ENCOUNTER
It's possible, if he was taking it daily prior to stopping abruptly. As previously recommended, I still recommend he see a Psychiatrist.

## 2024-09-29 ENCOUNTER — OFFICE VISIT (OUTPATIENT)
Dept: FAMILY MEDICINE CLINIC | Facility: CLINIC | Age: 34
End: 2024-09-29
Payer: COMMERCIAL

## 2024-09-29 VITALS
SYSTOLIC BLOOD PRESSURE: 114 MMHG | HEIGHT: 73 IN | RESPIRATION RATE: 18 BRPM | WEIGHT: 242 LBS | DIASTOLIC BLOOD PRESSURE: 60 MMHG | BODY MASS INDEX: 32.07 KG/M2 | OXYGEN SATURATION: 98 % | TEMPERATURE: 98 F | HEART RATE: 75 BPM

## 2024-09-29 DIAGNOSIS — S60.511A ABRASION OF RIGHT HAND, INITIAL ENCOUNTER: Primary | ICD-10-CM

## 2024-09-29 PROCEDURE — 3008F BODY MASS INDEX DOCD: CPT | Performed by: NURSE PRACTITIONER

## 2024-09-29 PROCEDURE — 3074F SYST BP LT 130 MM HG: CPT | Performed by: NURSE PRACTITIONER

## 2024-09-29 PROCEDURE — 99213 OFFICE O/P EST LOW 20 MIN: CPT | Performed by: NURSE PRACTITIONER

## 2024-09-29 PROCEDURE — 3078F DIAST BP <80 MM HG: CPT | Performed by: NURSE PRACTITIONER

## 2024-09-30 NOTE — PROGRESS NOTES
CHIEF COMPLAINT:     Chief Complaint   Patient presents with    Other     Lesions on both hands. For 3 days          HPI:   Nahum Rod is a 34 year old male who presents for evaluation of a skin injury that occurred 3 days ago, per patient,when pt was at work and did not have his protective gloves on. Patient states he believes that he may have chard's of steel imbedded in palm of right hand. 3 lesions in total: 1 on left hand, 2 on right hand. Patient has not treated at this time. Associated symptoms include none.  Denies sick exposure, Denies travel. Denies cough, eye pain, edema, fatigue, joint pain, SOB, CP or abdominal pain.      Current Outpatient Medications   Medication Sig Dispense Refill    fluticasone propionate 50 MCG/ACT Nasal Suspension USE TWO SPRAY(S) IN EACH NOSTRIL ONCE DAILY 3 each 3    Fexofenadine HCl (ALLEGRA OR)         Past Medical History:    Allergic rhinitis    Anal fissure    Depression    Elevated blood pressure    Hallucinations    History of chicken pox    Maxillary sinus cyst    1.5 cm polyp or retention cyst in Rt maxillary sinus    Mood disorder (HCC)    Psychosis (HCC)      History reviewed. No pertinent surgical history.   Family History   Problem Relation Age of Onset    Cancer Paternal Grandmother     Cancer Maternal Grandfather     Lipids Maternal Grandmother         hyperlipidemia    Hypertension Maternal Grandmother     Diabetes Maternal Grandmother     Heart Disorder Maternal Grandmother         atrial fibrillation    Hypertension Father     Thyroid Disorder Father         hypothyroidism    Lipids Father         hyperlipidemia    Diabetes Father     Lung Disorder Father         COPD    Hypertension Mother     Thyroid Disorder Mother         hypothyroidism    Diabetes Mother     Heart Disorder Mother     Hypertension Paternal Grandfather     Lipids Paternal Grandfather         hyperlipidemia    Stroke Paternal Grandfather     Diabetes Paternal Grandfather     Renal  Disease Paternal Grandfather         renal failure    Arthritis Paternal Grandfather     Cancer Maternal Grandfather     Thyroid Disorder Other         hypothyroidism, Siblings    Arthritis Maternal Grandfather     Cancer Sister       Social History     Socioeconomic History    Marital status: Single   Tobacco Use    Smoking status: Former     Current packs/day: 0.00     Types: Cigarettes     Quit date: 2017     Years since quittin.6     Passive exposure: Current    Smokeless tobacco: Former   Vaping Use    Vaping status: Never Used   Substance and Sexual Activity    Alcohol use: Not Currently     Comment: 1 X 6 MONTH    Drug use: No     Types: Cannabis   Other Topics Concern    Caffeine Concern No    Exercise No     Social Determinants of Health      Received from [facility]    Housing Stability         REVIEW OF SYSTEMS:   GENERAL:  usual appetite  SKIN: see HPI  HEENT: denies nasal congestion, sore throat    LUNGS: denies shortness of breath or wheezing  CARDIOVASCULAR: denies chest pain or palpitations   GI: denies N/V/C or abdominal pain  NEURO:denies headache    EXAM:   /60   Pulse 75   Temp 97.5 °F (36.4 °C) (Temporal)   Resp 18   Ht 6' 1\" (1.854 m)   Wt 242 lb (109.8 kg)   SpO2 98%   BMI 31.93 kg/m²   GENERAL: A&O x 3, no acute distress  SKIN: left hand with 4 mm scab in center of palm,  Right hand has 2 linear abrasions. # 1: 4 cm in length, # 2: 3 cm in length.   NECK: supple, non-tender  LUNGS: non labored breathing, clear to auscultation bilaterally, no wheezing, rales or rhonchi  CARDIO: RRR without murmur  EXTREMITIES: no cyanosis, clubbing or edema  LYMPH:  No cervical, submandibular, or supraclavicular lymphadenopathy.        ASSESSMENT:   Nahum Rod is a 34 year old male who presented for evaluation of a dermatological issue. Pt concerned that he may have imbedded steel in right hand,after working without gloves, injury occurred 3 days ago. 2 areas on  right hand are painful when with deep palpation, no erythema, edema or drainage present. Left hand has dried scab, pt reports is left palm is painfree, and not a concern.    Encounter Diagnosis   Name Primary?    Abrasion of right hand, initial encounter Yes         PLAN   Discussed with patient imaging is the only way to be 100% certain of imbedded steel in right hand.  Skin care discussed with the patient, keep area clean, soak hand in warm epsom salt water 2 times per day, pat dry, cover abrasions with band-aide if working and wear appropriate work gloves.  Please observe your right hand closely. If you develop worsening or changing symptoms, redness that increases, increase in pain, new or worsening fever, red streaks going up from wound, increase in drainage, or lack of improvement please seek immediate care at the ER as these can be signs of more serious illness requiring emergent evaluation and treatment.            Patient instructions handout sent to patient.  Follow up prn or with PCP if symptoms worsen or persist within 2-3 days as discussed.  Patient understands and agrees with plan.   Cherri Shankar, APRN  9/30/2024  10:13 AM

## 2024-10-01 NOTE — TELEPHONE ENCOUNTER
Left message for patient that we want to be sure he is feeling better.  Advised that Dr. Pedroza does recommend that he see psychiatrist, it is noted that he reported to Nurse Bermudez that he is seeing a therapist.    Patient advised to call back if he needs assistance with anything from our team.

## 2024-10-02 NOTE — TELEPHONE ENCOUNTER
Patient returned call. Relayed AMS recommendations. Patient states he sees a therapist but does not want to see a psychiatrist. He states they just throw medications at him to \"see what sticks\" and it usually causes aggressive behavior in him. He agrees to appointment to further evaluate headaches. Appointment scheduled. ER precautions reviewed with patient. He confirms understanding.     FYI to AMS

## 2024-10-05 ENCOUNTER — OFFICE VISIT (OUTPATIENT)
Dept: FAMILY MEDICINE CLINIC | Facility: CLINIC | Age: 34
End: 2024-10-05
Payer: COMMERCIAL

## 2024-10-05 VITALS
DIASTOLIC BLOOD PRESSURE: 74 MMHG | BODY MASS INDEX: 32.2 KG/M2 | WEIGHT: 243 LBS | HEART RATE: 79 BPM | HEIGHT: 73 IN | RESPIRATION RATE: 18 BRPM | TEMPERATURE: 97 F | SYSTOLIC BLOOD PRESSURE: 128 MMHG | OXYGEN SATURATION: 97 %

## 2024-10-05 DIAGNOSIS — J06.9 URI WITH COUGH AND CONGESTION: Primary | ICD-10-CM

## 2024-10-05 PROCEDURE — 3078F DIAST BP <80 MM HG: CPT | Performed by: PHYSICIAN ASSISTANT

## 2024-10-05 PROCEDURE — 99213 OFFICE O/P EST LOW 20 MIN: CPT | Performed by: PHYSICIAN ASSISTANT

## 2024-10-05 PROCEDURE — 87637 SARSCOV2&INF A&B&RSV AMP PRB: CPT | Performed by: PHYSICIAN ASSISTANT

## 2024-10-05 PROCEDURE — 3074F SYST BP LT 130 MM HG: CPT | Performed by: PHYSICIAN ASSISTANT

## 2024-10-05 PROCEDURE — 3008F BODY MASS INDEX DOCD: CPT | Performed by: PHYSICIAN ASSISTANT

## 2024-10-05 RX ORDER — BENZONATATE 200 MG/1
200 CAPSULE ORAL 3 TIMES DAILY PRN
Qty: 30 CAPSULE | Refills: 0 | Status: SHIPPED | OUTPATIENT
Start: 2024-10-05 | End: 2024-10-07

## 2024-10-05 NOTE — PROGRESS NOTES
CHIEF COMPLAINT:     Chief Complaint   Patient presents with    Upper Respiratory Infection     X 3 days, feverish, cough, sore throat, no otc         HPI:   Nahum Rod is a 34 year old male who presents for cold symptoms for 3 days.  + fever. + body aches/chills. + headache. No nasal congestion. Left ear pain. Mild sore throat. Dry cough. No chest pain or SOB. No GI symptoms. Taking aleve and allergy medication       Current Outpatient Medications   Medication Sig Dispense Refill    benzonatate 200 MG Oral Cap Take 1 capsule (200 mg total) by mouth 3 (three) times daily as needed for cough. 30 capsule 0    fluticasone propionate 50 MCG/ACT Nasal Suspension USE TWO SPRAY(S) IN EACH NOSTRIL ONCE DAILY 3 each 3    Fexofenadine HCl (ALLEGRA OR)         Past Medical History:    Allergic rhinitis    Anal fissure    Depression    Elevated blood pressure    Hallucinations    History of chicken pox    Maxillary sinus cyst    1.5 cm polyp or retention cyst in Rt maxillary sinus    Mood disorder (HCC)    Psychosis (HCC)      No past surgical history on file.   Family History   Problem Relation Age of Onset    Cancer Paternal Grandmother     Cancer Maternal Grandfather     Lipids Maternal Grandmother         hyperlipidemia    Hypertension Maternal Grandmother     Diabetes Maternal Grandmother     Heart Disorder Maternal Grandmother         atrial fibrillation    Hypertension Father     Thyroid Disorder Father         hypothyroidism    Lipids Father         hyperlipidemia    Diabetes Father     Lung Disorder Father         COPD    Hypertension Mother     Thyroid Disorder Mother         hypothyroidism    Diabetes Mother     Heart Disorder Mother     Hypertension Paternal Grandfather     Lipids Paternal Grandfather         hyperlipidemia    Stroke Paternal Grandfather     Diabetes Paternal Grandfather     Renal Disease Paternal Grandfather         renal failure    Arthritis Paternal Grandfather     Cancer Maternal  Grandfather     Thyroid Disorder Other         hypothyroidism, Siblings    Arthritis Maternal Grandfather     Cancer Sister       Social History     Socioeconomic History    Marital status: Single   Tobacco Use    Smoking status: Former     Current packs/day: 0.00     Types: Cigarettes     Quit date: 2017     Years since quittin.6     Passive exposure: Current    Smokeless tobacco: Former   Vaping Use    Vaping status: Never Used   Substance and Sexual Activity    Alcohol use: Not Currently     Comment: 1 X 6 MONTH    Drug use: No     Types: Cannabis   Other Topics Concern    Caffeine Concern No    Exercise No     Social Determinants of Health      Received from [facility]    Housing Stability         REVIEW OF SYSTEMS:   GENERAL:  denies  diminished appetite  SKIN: no rashes or abnormal skin lesions  HEENT: See HPI.    LUNGS: denies shortness of breath or wheezing, See HPI  CARDIOVASCULAR: denies chest pain or palpitations   GI: denies N/V/C or abdominal pain  NEURO: + sinus headaches.  No numbness or tingling in face.    EXAM:   /74   Pulse 79   Temp 97 °F (36.1 °C)   Resp 18   Ht 6' 1\" (1.854 m)   Wt 243 lb (110.2 kg)   SpO2 97%   BMI 32.06 kg/m²   Physical Exam  Constitutional:       General: He is not in acute distress.     Appearance: Normal appearance.   HENT:      Head: Normocephalic.      Right Ear: Tympanic membrane, ear canal and external ear normal.      Left Ear: Tympanic membrane, ear canal and external ear normal.      Nose: Nose normal.      Mouth/Throat:      Mouth: Mucous membranes are moist.      Pharynx: Oropharynx is clear. Uvula midline. Posterior oropharyngeal erythema (post nasal drip) present.      Tonsils: No tonsillar exudate.   Eyes:      Conjunctiva/sclera: Conjunctivae normal.      Pupils: Pupils are equal, round, and reactive to light.   Cardiovascular:      Rate and Rhythm: Normal rate and regular rhythm.      Heart sounds: Normal heart  sounds. No murmur heard.  Pulmonary:      Effort: Pulmonary effort is normal. No respiratory distress.      Breath sounds: Normal breath sounds. No wheezing or rhonchi.   Chest:      Chest wall: No tenderness.   Musculoskeletal:      Cervical back: Normal range of motion and neck supple.   Lymphadenopathy:      Cervical: No cervical adenopathy.   Skin:     General: Skin is warm.      Findings: No rash.   Neurological:      Mental Status: He is alert and oriented to person, place, and time.          No results found for this or any previous visit (from the past 24 hour(s)).    ASSESSMENT AND PLAN:   Nahum Rod is a 34 year old male who presents with URI symptoms that are consistent with:      ASSESSMENT:  Encounter Diagnosis   Name Primary?    URI with cough and congestion Yes     Quad test sent out     PLAN: Meds as below.  Comfort care instructions as listed in Patient Instructions    Meds & Refills for this Visit:  Requested Prescriptions     Signed Prescriptions Disp Refills    benzonatate 200 MG Oral Cap 30 capsule 0     Sig: Take 1 capsule (200 mg total) by mouth 3 (three) times daily as needed for cough.       Risks, benefits, side effects of medication addressed and explained.    Patient Instructions   Rest   Push fluids   Tylenol or ibuprofen OTC as needed for pain/fever   Continue allegra and Flonase   Cough medication as needed  Please follow up with PCP if no improvement or if symptoms worsen      The patient indicates understanding of these issues and agrees to the plan.  The patient is asked to f/u with PCP if sx's persist or worsen.

## 2024-10-05 NOTE — PATIENT INSTRUCTIONS
Rest   Push fluids   Tylenol or ibuprofen OTC as needed for pain/fever   Continue allegra and Flonase   Cough medication as needed  Please follow up with PCP if no improvement or if symptoms worsen

## 2024-10-06 LAB
FLUAV + FLUBV RNA SPEC NAA+PROBE: NOT DETECTED
FLUAV + FLUBV RNA SPEC NAA+PROBE: NOT DETECTED
RSV RNA SPEC NAA+PROBE: NOT DETECTED
SARS-COV-2 RNA RESP QL NAA+PROBE: NOT DETECTED

## 2024-10-07 ENCOUNTER — OFFICE VISIT (OUTPATIENT)
Dept: INTERNAL MEDICINE CLINIC | Facility: CLINIC | Age: 34
End: 2024-10-07
Payer: COMMERCIAL

## 2024-10-07 VITALS
DIASTOLIC BLOOD PRESSURE: 76 MMHG | WEIGHT: 243 LBS | BODY MASS INDEX: 32.2 KG/M2 | SYSTOLIC BLOOD PRESSURE: 126 MMHG | HEIGHT: 73 IN | HEART RATE: 72 BPM | OXYGEN SATURATION: 98 %

## 2024-10-07 DIAGNOSIS — R51.9 DAILY HEADACHE: ICD-10-CM

## 2024-10-07 DIAGNOSIS — R51.9 NEW ONSET HEADACHE: Primary | ICD-10-CM

## 2024-10-07 PROCEDURE — G2211 COMPLEX E/M VISIT ADD ON: HCPCS | Performed by: FAMILY MEDICINE

## 2024-10-07 PROCEDURE — 3078F DIAST BP <80 MM HG: CPT | Performed by: FAMILY MEDICINE

## 2024-10-07 PROCEDURE — 3074F SYST BP LT 130 MM HG: CPT | Performed by: FAMILY MEDICINE

## 2024-10-07 PROCEDURE — 99214 OFFICE O/P EST MOD 30 MIN: CPT | Performed by: FAMILY MEDICINE

## 2024-10-07 PROCEDURE — 3008F BODY MASS INDEX DOCD: CPT | Performed by: FAMILY MEDICINE

## 2024-10-07 RX ORDER — PREDNISONE 20 MG/1
TABLET ORAL
Qty: 14 TABLET | Refills: 0 | Status: SHIPPED | OUTPATIENT
Start: 2024-10-07

## 2024-10-08 NOTE — PROGRESS NOTES
Subjective:   Patient ID: Nahum Rod is a 34 year old male.     HPI Here with c/o headache he has had for about one month. Back of head and radiating to top of head. Has constant pain and waking him up at night. Patient reports at worse is 14/10. With Advil migraine is 8/10. Difficulty concentrating due to pain.     History/Other:   Past Medical History:    Allergic rhinitis    Anal fissure    Depression    Elevated blood pressure    Hallucinations    History of chicken pox    Maxillary sinus cyst    1.5 cm polyp or retention cyst in Rt maxillary sinus    Mood disorder (HCC)    Psychosis (HCC)       Review of Systems   Constitutional:  Negative for chills and fever.   Eyes:  Positive for photophobia. Negative for redness.   Neurological:  Negative for weakness and numbness.     Current Outpatient Medications   Medication Sig Dispense Refill    predniSONE 20 MG Oral Tab 3 tabs PO daily days 1-2, 2 tabs PO daily days 3-5, one tab PO daily days 6-7 14 tablet 0    fluticasone propionate 50 MCG/ACT Nasal Suspension USE TWO SPRAY(S) IN EACH NOSTRIL ONCE DAILY 3 each 3    Fexofenadine HCl (ALLEGRA OR)        Allergies:No Known Allergies    Objective:   Physical Exam  Vitals reviewed.   Constitutional:       Appearance: Normal appearance. He is well-developed.   HENT:      Head: Normocephalic and atraumatic.   Pulmonary:      Effort: Pulmonary effort is normal.   Neurological:      Mental Status: He is alert.      Cranial Nerves: Cranial nerves 2-12 are intact.      Motor: Motor function is intact.      Coordination: Coordination is intact. Finger-Nose-Finger Test normal.      Gait: Gait is intact.   Psychiatric:         Mood and Affect: Mood normal.         Behavior: Behavior normal.         Assessment & Plan:   1. New onset headache    2. Daily headache    Head CT due to new and severe.   Start oral steroid course. Discussed risks/benefits/potential side effects and proper use of medication.   Tylenol if needed.      No orders of the defined types were placed in this encounter.      Meds This Visit:  Requested Prescriptions     Signed Prescriptions Disp Refills    predniSONE 20 MG Oral Tab 14 tablet 0     Sig: 3 tabs PO daily days 1-2, 2 tabs PO daily days 3-5, one tab PO daily days 6-7       Imaging & Referrals:  CT BRAIN OR HEAD (CPT=70450)

## 2024-10-14 ENCOUNTER — HOSPITAL ENCOUNTER (OUTPATIENT)
Dept: CT IMAGING | Age: 34
Discharge: HOME | End: 2024-10-14
Attending: FAMILY MEDICINE
Payer: COMMERCIAL

## 2024-10-14 DIAGNOSIS — R51.9 DAILY HEADACHE: ICD-10-CM

## 2024-10-14 DIAGNOSIS — R51.9 NEW ONSET HEADACHE: ICD-10-CM

## 2024-10-14 PROCEDURE — 70450 CT HEAD/BRAIN W/O DYE: CPT | Performed by: FAMILY MEDICINE

## 2024-11-04 ENCOUNTER — NURSE TRIAGE (OUTPATIENT)
Dept: INTERNAL MEDICINE CLINIC | Facility: CLINIC | Age: 34
End: 2024-11-04

## 2024-11-04 NOTE — TELEPHONE ENCOUNTER
Action Requested: Summary for Provider     []  Critical Lab, Recommendations Needed  [x] Need Additional Advice  []   FYI    []   Need Orders  [] Need Medications Sent to Pharmacy  []  Other     SUMMARY: Patient requesting appointment for hip pain.  Dr Pedroza are you able to see patient this week? No appointments available.    Patient states 2-3 weeks ago he was at work, using a tool that requires him to squat down and push down to activate, he was spun around and since then he has intermittent sharp pain in left hip. Worse when sitting, hurts to initially walk, but once he is walking for a while pain eases up. Pain radiates to thigh. Denies any swelling. Has not tried any medications, ice or heat.  Advised patient to try tylenol or ibuprofen, ice x 20 min at a time.     Reason for call: Hip Pain  Onset: 2-3 weeks     Reason for Disposition   Patient wants to be seen    Protocols used: Hip Pain-A-[place]

## 2024-11-04 NOTE — TELEPHONE ENCOUNTER
Patient called back, appt scheduled for 11/6 at 9am with BD    Future Appointments   Date Time Provider Department Center   11/6/2024  9:00 AM Mariely Vivas, [facility] [facility]   11/18/2024  1:30 PM Kinjal Wolfe [facility]   11/25/2024  1:00 PM Kinjal Wolfe [facility]

## 2024-12-16 ENCOUNTER — PATIENT MESSAGE (OUTPATIENT)
Dept: INTERNAL MEDICINE CLINIC | Facility: CLINIC | Age: 34
End: 2024-12-16

## 2025-01-08 ENCOUNTER — OFFICE VISIT (OUTPATIENT)
Dept: INTERNAL MEDICINE CLINIC | Facility: CLINIC | Age: 35
End: 2025-01-08
Payer: COMMERCIAL

## 2025-01-08 VITALS
RESPIRATION RATE: 14 BRPM | TEMPERATURE: 98 F | BODY MASS INDEX: 29.8 KG/M2 | WEIGHT: 224.88 LBS | DIASTOLIC BLOOD PRESSURE: 86 MMHG | SYSTOLIC BLOOD PRESSURE: 128 MMHG | HEIGHT: 73 IN | HEART RATE: 84 BPM | OXYGEN SATURATION: 98 %

## 2025-01-08 DIAGNOSIS — Z72.0 TOBACCO USE: Primary | ICD-10-CM

## 2025-01-08 PROCEDURE — 3008F BODY MASS INDEX DOCD: CPT | Performed by: FAMILY MEDICINE

## 2025-01-08 PROCEDURE — 3074F SYST BP LT 130 MM HG: CPT | Performed by: FAMILY MEDICINE

## 2025-01-08 PROCEDURE — 3079F DIAST BP 80-89 MM HG: CPT | Performed by: FAMILY MEDICINE

## 2025-01-08 RX ORDER — BUPROPION HYDROCHLORIDE 150 MG/1
TABLET, EXTENDED RELEASE ORAL
Qty: 90 TABLET | Refills: 0 | Status: SHIPPED | OUTPATIENT
Start: 2025-01-08

## 2025-01-09 NOTE — PROGRESS NOTES
Here for request for refill of Bupropion. Patient has been prescribed this in the past and did not need appt. However, patient was instructed to make appt by centralized triage. No LOS. Rx refilled.

## 2025-01-20 ENCOUNTER — OFFICE VISIT (OUTPATIENT)
Dept: FAMILY MEDICINE CLINIC | Facility: CLINIC | Age: 35
End: 2025-01-20
Payer: COMMERCIAL

## 2025-01-20 VITALS
SYSTOLIC BLOOD PRESSURE: 133 MMHG | OXYGEN SATURATION: 98 % | RESPIRATION RATE: 18 BRPM | HEART RATE: 71 BPM | WEIGHT: 226 LBS | BODY MASS INDEX: 29.95 KG/M2 | DIASTOLIC BLOOD PRESSURE: 79 MMHG | TEMPERATURE: 97 F | HEIGHT: 73 IN

## 2025-01-20 DIAGNOSIS — J02.9 SORE THROAT: Primary | ICD-10-CM

## 2025-01-20 DIAGNOSIS — J02.9 PHARYNGITIS, UNSPECIFIED ETIOLOGY: ICD-10-CM

## 2025-01-20 LAB
CONTROL LINE PRESENT WITH A CLEAR BACKGROUND (YES/NO): YES YES/NO
KIT LOT #: NORMAL NUMERIC

## 2025-01-20 NOTE — PROGRESS NOTES
CHIEF COMPLAINT:     Chief Complaint   Patient presents with    Sore Throat     X 1 day, no otc       HPI:   Nahum Rod is a 34 year old male presents to clinic with symptoms of sore throat. Patient has had for 1 days. Symptoms have been consistent since onset.  Patient reports following associated symptoms: post nasal drainage, throat clearing. Has remote history of strep. Daughter is sick at home right now with sore throat  Treating symptoms with: none.     Current Outpatient Medications   Medication Sig Dispense Refill    buPROPion  MG Oral Tablet 12 Hr One tab PO daily x 3 days then one tab PO BID. Quit smoking on day 8 of medication. 90 tablet 0    fluticasone propionate 50 MCG/ACT Nasal Suspension USE TWO SPRAY(S) IN EACH NOSTRIL ONCE DAILY 3 each 3    Fexofenadine HCl (ALLEGRA OR)         Past Medical History:    Allergic rhinitis    Anal fissure    Depression    Elevated blood pressure    Hallucinations    History of chicken pox    Maxillary sinus cyst    1.5 cm polyp or retention cyst in Rt maxillary sinus    Mood disorder (HCC)    Psychosis (HCC)      Social History:  Social History     Socioeconomic History    Marital status: Single   Tobacco Use    Smoking status: Former     Current packs/day: 0.00     Types: Cigarettes     Quit date: 2017     Years since quittin.9     Passive exposure: Current    Smokeless tobacco: Former   Vaping Use    Vaping status: Never Used   Substance and Sexual Activity    Alcohol use: Not Currently     Comment: 1 X 6 MONTH    Drug use: No     Types: Cannabis   Other Topics Concern    Caffeine Concern No    Stress Concern Yes    Weight Concern No    Special Diet No    Exercise No    Seat Belt No     Social Drivers of Health      Received from [facility]    Housing Stability        REVIEW OF SYSTEMS:   GENERAL HEALTH: feels well otherwise  SKIN: denies any unusual skin lesions or rashes  HEENT: denies ear pain, See HPI  RESPIRATORY:  denies shortness of breath, wheezing, or cough  CARDIOVASCULAR: denies chest pain, palpitations   GI: denies abdominal pain, constipation and diarrhea  NEURO: denies dizziness or lightheadedness    EXAM:   /79   Pulse 71   Temp 97.4 °F (36.3 °C)   Resp 18   Ht 6' 1\" (1.854 m)   Wt 226 lb (102.5 kg)   SpO2 98%   BMI 29.82 kg/m²   GENERAL: well developed, well nourished,in no apparent distress  SKIN: no rashes,no suspicious lesions  HEAD: atraumatic, normocephalic  EYES: conjunctiva clear, EOM intact  EARS: TM's clear, non-injected, no bulging, retraction, or fluid  NOSE: nostrils patent, no exudates, nasal mucosa pink and noninflamed  THROAT: oral mucosa pink, moist. Posterior pharynx erythematous and injected. no exudates. Tonsils 1/4.    NECK: supple, non-tender  LUNGS: clear to auscultation bilaterally, no wheezes or rhonchi. No crackles/rales, good air movement throughout. Breathing is non labored.  CARDIO: RRR without murmur  EXTREMITIES: no cyanosis, clubbing or edema  LYMPH:Negative anterior cervical  lymphadenopathy.  No posterior cervical or occipital lymphadenopathy.    Recent Results (from the past 24 hours)   Strep A Assay W/Optic    Collection Time: 01/20/25  9:36 AM   Result Value Ref Range    Strep Grp A Screen neg Negative    Control Line Present with a clear background (yes/no) yes Yes/No    Kit Lot # [id number] Numeric    Kit Expiration Date 11/4/25 Date       ASSESSMENT AND PLAN:   Nahum Rod is a 34 year old male who presents with   ASSESSMENT:   Encounter Diagnoses   Name Primary?    Sore throat Yes    Pharyngitis, unspecified etiology        PLAN: Negative rapid strep. Discussed viral vs bacterial etiology of URIs, including pharyngitis, laryngitis, bronchitis and sinus congestion/pain. Patient was informed that antibiotics are not effective for treating viral ailments and can result in antibiotic resistence. Reviewed symptom relief measures with patient. Patient is  amenable to  symptom relief measures.  Motrin per package instructions for pain.     Meds & Refills for this Visit:  Requested Prescriptions      No prescriptions requested or ordered in this encounter       Risks, benefits, and side effects of medication explained and discussed.    There are no Patient Instructions on file for this visit.    The patient indicates understanding of these issues and agrees to the plan.  The patient is asked to return if sx's persist or worsen.    Increase fluids, Motrin/Tylenol prn, rest.  Patient is to follow up if fever greater than or equal to 100.4 persists for 72 hours.

## 2025-01-28 ENCOUNTER — NURSE TRIAGE (OUTPATIENT)
Dept: INTERNAL MEDICINE CLINIC | Facility: CLINIC | Age: 35
End: 2025-01-28

## 2025-01-28 NOTE — TELEPHONE ENCOUNTER
Action Requested: Summary for Provider     []  Critical Lab, Recommendations Needed  [] Need Additional Advice  []   FYI    []   Need Orders  [] Need Medications Sent to Pharmacy  []  Other     SUMMARY: Made appointment longer (30 mins). Headaches ongoing since October, worsening past month. Strong ER warning signs given.   Future Appointments   Date Time Provider Department Center   2/4/2025  2:00 PM Yojana Pedroza DO [facility]   Reason for call: Headache  Onset: Worsening past month     Reason for Disposition   Headache is a chronic symptom (recurrent or ongoing AND lasting > 4 weeks)    Protocols used: Headache-A-OH    Stated headaches are ongoing, worse past month   Happen daily  Current pain level 3-4/10. Feels like he is \"accustomed to pain\"   Sometimes has \"burning sensation to brain\"   Advil migraine helps bring down pain   Pain level varies. Sometimes has blurred vision, dizziness or \"feels like someone is cutting brain in half\"  Advised if pain is that severe, has dizziness or blurred vision needs to go to ER. Pt verbalizes understanding.   Extended appointment. Provided strong ER warning signs.

## 2025-01-28 NOTE — TELEPHONE ENCOUNTER
Patient booked an appointment online for the following concerns:    \"I’m haveing chronic headaches that are crippling ie. hard to focus, hard to walk, hard to keep calm \"    Requesting RN assistance with triage please and thank you.

## 2025-02-04 ENCOUNTER — OFFICE VISIT (OUTPATIENT)
Dept: INTERNAL MEDICINE CLINIC | Facility: CLINIC | Age: 35
End: 2025-02-04
Payer: COMMERCIAL

## 2025-02-04 VITALS
OXYGEN SATURATION: 98 % | HEIGHT: 73 IN | HEART RATE: 77 BPM | RESPIRATION RATE: 17 BRPM | BODY MASS INDEX: 30.65 KG/M2 | WEIGHT: 231.25 LBS | SYSTOLIC BLOOD PRESSURE: 120 MMHG | TEMPERATURE: 97 F | DIASTOLIC BLOOD PRESSURE: 70 MMHG

## 2025-02-04 DIAGNOSIS — G43.709 CHRONIC MIGRAINE WITHOUT AURA WITHOUT STATUS MIGRAINOSUS, NOT INTRACTABLE: Primary | ICD-10-CM

## 2025-02-04 PROCEDURE — 3078F DIAST BP <80 MM HG: CPT | Performed by: FAMILY MEDICINE

## 2025-02-04 PROCEDURE — 99214 OFFICE O/P EST MOD 30 MIN: CPT | Performed by: FAMILY MEDICINE

## 2025-02-04 PROCEDURE — 3074F SYST BP LT 130 MM HG: CPT | Performed by: FAMILY MEDICINE

## 2025-02-04 PROCEDURE — 3008F BODY MASS INDEX DOCD: CPT | Performed by: FAMILY MEDICINE

## 2025-02-04 PROCEDURE — G2211 COMPLEX E/M VISIT ADD ON: HCPCS | Performed by: FAMILY MEDICINE

## 2025-02-04 RX ORDER — PROPRANOLOL HYDROCHLORIDE 40 MG/1
40 TABLET ORAL DAILY
Qty: 90 TABLET | Refills: 0 | Status: SHIPPED | OUTPATIENT
Start: 2025-02-04

## 2025-02-04 NOTE — PROGRESS NOTES
Subjective:   Patient ID: Nahum Rod is a 34 year old male.    HPI Dizzy, nausea, light and sound sensitivity and frequent headaches. Started months ago and prior to starting Bupropion for smoking cessation.     History/Other:   Review of Systems   Constitutional:  Negative for activity change.   Respiratory:  Negative for chest tightness and shortness of breath.    Neurological:  Positive for headaches. Negative for weakness and numbness.     Current Outpatient Medications   Medication Sig Dispense Refill    propranolol 40 MG Oral Tab Take 1 tablet (40 mg total) by mouth daily. 90 tablet 0    buPROPion  MG Oral Tablet 12 Hr One tab PO daily x 3 days then one tab PO BID. Quit smoking on day 8 of medication. 90 tablet 0    fluticasone propionate 50 MCG/ACT Nasal Suspension USE TWO SPRAY(S) IN EACH NOSTRIL ONCE DAILY 3 each 3    Fexofenadine HCl (ALLEGRA OR)        Allergies:Allergies[1]    Objective:   Physical Exam  Vitals reviewed.   Constitutional:       Appearance: Normal appearance. He is well-developed.   HENT:      Head: Normocephalic and atraumatic.   Pulmonary:      Effort: Pulmonary effort is normal.   Neurological:      Mental Status: He is alert.   Psychiatric:         Mood and Affect: Mood normal.         Behavior: Behavior normal.         Assessment & Plan:   1. Chronic migraine without aura without status migrainosus, not intractable    Reviewed CT head. Start Propranolol. Discussed risks/benefits/potential side effects and proper use of medication.   Neuro referral.     No orders of the defined types were placed in this encounter.      Meds This Visit:  Requested Prescriptions     Signed Prescriptions Disp Refills    propranolol 40 MG Oral Tab 90 tablet 0     Sig: Take 1 tablet (40 mg total) by mouth daily.       Imaging & Referrals:  NEURO - INTERNAL         [1] No Known Allergies

## 2025-02-12 ENCOUNTER — TELEPHONE (OUTPATIENT)
Dept: INTERNAL MEDICINE CLINIC | Facility: CLINIC | Age: 35
End: 2025-02-12

## 2025-02-12 NOTE — TELEPHONE ENCOUNTER
Received [place] [employer]  form.  Payment Received  Form scanned and emailed.  Original form sent to Forms Department via Interoffice mail.

## 2025-02-17 ENCOUNTER — HOSPITAL ENCOUNTER (EMERGENCY)
Facility: HOSPITAL | Age: 35
Discharge: HOME | End: 2025-02-17
Attending: EMERGENCY MEDICINE
Payer: COMMERCIAL

## 2025-02-17 VITALS
WEIGHT: 230 LBS | DIASTOLIC BLOOD PRESSURE: 80 MMHG | OXYGEN SATURATION: 97 % | BODY MASS INDEX: 30.48 KG/M2 | SYSTOLIC BLOOD PRESSURE: 135 MMHG | RESPIRATION RATE: 20 BRPM | TEMPERATURE: 97 F | HEART RATE: 65 BPM | HEIGHT: 73 IN

## 2025-02-17 DIAGNOSIS — G43.809 OTHER MIGRAINE WITHOUT STATUS MIGRAINOSUS, NOT INTRACTABLE: Primary | ICD-10-CM

## 2025-02-17 PROCEDURE — 96361 HYDRATE IV INFUSION ADD-ON: CPT

## 2025-02-17 PROCEDURE — 96374 THER/PROPH/DIAG INJ IV PUSH: CPT

## 2025-02-17 PROCEDURE — 99284 EMERGENCY DEPT VISIT MOD MDM: CPT

## 2025-02-17 PROCEDURE — 96375 TX/PRO/DX INJ NEW DRUG ADDON: CPT

## 2025-02-17 RX ORDER — METOCLOPRAMIDE HYDROCHLORIDE 5 MG/ML
10 INJECTION INTRAMUSCULAR; INTRAVENOUS ONCE
Status: COMPLETED | OUTPATIENT
Start: 2025-02-17 | End: 2025-02-17

## 2025-02-17 RX ORDER — DIPHENHYDRAMINE HYDROCHLORIDE 50 MG/ML
25 INJECTION INTRAMUSCULAR; INTRAVENOUS ONCE
Status: COMPLETED | OUTPATIENT
Start: 2025-02-17 | End: 2025-02-17

## 2025-02-17 RX ORDER — KETOROLAC TROMETHAMINE 15 MG/ML
15 INJECTION, SOLUTION INTRAMUSCULAR; INTRAVENOUS ONCE
Status: COMPLETED | OUTPATIENT
Start: 2025-02-17 | End: 2025-02-17

## 2025-02-17 NOTE — ED QUICK NOTES
Rounding Completed    Plan of Care reviewed. Pt resting with eyes closed  Elimination needs assessed.  Provided water.    Bed is locked and in lowest position. Call light within reach.

## 2025-02-17 NOTE — ED PROVIDER NOTES
Patient Seen in: [facility] Emergency Department      History     Chief Complaint   Patient presents with    Migraine     Stated Complaint: migraine    Subjective:   HPI      Patient is a 34-year-old gentleman presents with recurrent headaches.  Says he has had off-and-on headaches since September.  He seen his primary care doctor.  Was started on propranolol.  This headache started yesterday.  Has had headaches like this before.  Has a head CT during this episode that was negative.  Scheduled to see a neurologist though not till March.  Describes 8 out of 10 pain.  Very light sensitive.  No focal numbness or weakness.  No neck pain.  No other specific complaints.  Patient's head CT in [facility] from 10/14/2024 reviewed and essentially normal.    Objective:     Past Medical History:    Allergic rhinitis    Anal fissure    Depression    Elevated blood pressure    Hallucinations    History of chicken pox    Maxillary sinus cyst    1.5 cm polyp or retention cyst in Rt maxillary sinus    Mood disorder    Psychosis (HCC)              History reviewed. No pertinent surgical history.             Social History     Socioeconomic History    Marital status: Single   Tobacco Use    Smoking status: Former     Current packs/day: 0.00     Types: Cigarettes     Quit date: 2017     Years since quittin.0     Passive exposure: Current    Smokeless tobacco: Former   Vaping Use    Vaping status: Never Used   Substance and Sexual Activity    Alcohol use: Not Currently     Comment: 1 X 6 MONTH    Drug use: No     Types: Cannabis   Other Topics Concern    Caffeine Concern No    Stress Concern Yes    Weight Concern No    Special Diet No    Exercise No    Seat Belt No     Social Drivers of Health      Received from [facility]    Housing Stability                  Physical Exam     ED Triage Vitals   BP 25 0954 143/78   Pulse 25 0953 67   Resp 25 0953 18   Temp 25 0953 97.3 °F (36.3 °C)    Temp src 02/17/25 0953 Temporal   SpO2 02/17/25 0953 96 %   O2 Device 02/17/25 0953 None (Room air)       Current Vitals:   Vital Signs  BP: 135/80  Pulse: 65  Resp: 20  Temp: 97.3 °F (36.3 °C)  Temp src: Temporal    Oxygen Therapy  SpO2: 97 %  O2 Device: None (Room air)        Physical Exam  General: Well-appearing patient of stated age resting comfortably  HEENT: Normocephalic atraumatic.  Positive photophobia nonicteric sclera.  Moist mucous membranes  Lungs: No tachypnea  Cardiac: No tachycardia  Skin: No rashes, pallor  Neuro: No focal deficits.  Normal speech, nonfocal.  Extremities: No cyanosis edema    ED Course   Labs Reviewed - No data to display                MDM      Patient presents with recurrent headaches.  Differential clues tension, migraine, other.  Has had head CT in the recent past.  Will treat with Toradol, Reglan, Benadryl.  Will hydrate.  Will reassess after treatment.  Patient was treated with IV Reglan/Benadryl/Toradol per protocol.  Was hydrated.  He was sleeping.  On reassessment he felt much better.  Will follow-up with neurology as planned.  Return if worsening symptoms, new complaints.      Medical Decision Making      Disposition and Plan     Clinical Impression:  1. Other migraine without status migrainosus, not intractable         Disposition:  Discharge  2/17/2025 11:57 am    Follow-up:  Yojana Pedroza DO  [address] [place] [address], [address] [place] [id number]  [phone]    Follow up in 1 week(s)      neurology    Follow up  as scheduled          Medications Prescribed:  Discharge Medication List as of 2/17/2025 11:59 AM              Supplementary Documentation:

## 2025-02-20 ENCOUNTER — PATIENT OUTREACH (OUTPATIENT)
Dept: CASE MANAGEMENT | Age: 35
End: 2025-02-20

## 2025-02-20 NOTE — PROGRESS NOTES
Patient had recent Emergency Room visit, calling to offer Primary Care Physician follow-up appointment (discharged 02/17)    Dr Yojana Pedroza  [id number] [place] [address]  [phone]    Attempt #1:  Left message on voicemail for patient to call transitions specialist back to schedule follow up appointments. Provided Transitions specialist scheduling phone number [phone].

## 2025-02-20 NOTE — TELEPHONE ENCOUNTER
Called and Left message for patient to call and provide details. Please get details when patient calls and task provider if needed.

## 2025-02-21 NOTE — PROGRESS NOTES
ED Hospital Follow up for PCP  (Discharge 2/17 edw)       PCP  Yojana Pedroza  Family Medicine  [address] [address]  [phone]  Unable to contact pt after multiple attempts  Attempt #2:  Left message on voicemail for patient to call transitions specialist back to schedule follow up appointments. Provided Transitions specialist scheduling phone number [phone]. Closing encounter. Will re-open if patient returns call.

## 2025-02-24 NOTE — TELEPHONE ENCOUNTER
Patient called back, gathered the below;    Type of Leave:  Intermittent Family Medical Leave Act   Reason for Leave: Migraines  Start date of leave: 02/4/2025  End date of leave:02/03/2026  How many flare ups per month/length?: 1-4 flare ups a month lasting 1-2 days in duration   How many appts per month/length?: 1-4 appointments a month lasting 1-4 hrs each appointment   Was Fee and Turnaround info Given?: Yes     Tasked Provider

## 2025-02-24 NOTE — TELEPHONE ENCOUNTER
He is going to see the Neurologist for this on 3/14/25 so he will need to discuss this with and have Neuro complete this if they feel appropriate.

## 2025-02-24 NOTE — TELEPHONE ENCOUNTER
Dr. Pedroza,     Patient is seeking Intermittent Family Medical Leave Act starting 02/04/2025-02/03/2026 due to Chronic condition of Migraines, patient is seeking 1-4 flare ups a month lasting 1-2 days in duration, patient also seeking 1-4 appointments a month lasting 1-4 hrs each appointment, do you support?    Thanks,   Marita

## 2025-02-25 NOTE — TELEPHONE ENCOUNTER
Called and Left message informing of provider response to  request for intermittent Family Medical Leave Act - and form will be PLC ON HOLD  and to call the forms department after appointment.

## 2025-03-07 RX ORDER — BUPROPION HYDROCHLORIDE 150 MG/1
TABLET, EXTENDED RELEASE ORAL
Qty: 90 TABLET | Refills: 0 | Status: SHIPPED | OUTPATIENT
Start: 2025-03-07

## 2025-03-07 NOTE — TELEPHONE ENCOUNTER
Please kindly review this medication    [x] FAILS PROTOCOL    [] HAS NO PROTOCOL ATTACHED    Dr. Yovany posey to provide a refill?

## 2025-03-10 ENCOUNTER — OFFICE VISIT (OUTPATIENT)
Dept: NEUROLOGY | Facility: CLINIC | Age: 35
End: 2025-03-10
Payer: COMMERCIAL

## 2025-03-10 VITALS
RESPIRATION RATE: 16 BRPM | HEART RATE: 92 BPM | BODY MASS INDEX: 30.48 KG/M2 | DIASTOLIC BLOOD PRESSURE: 58 MMHG | SYSTOLIC BLOOD PRESSURE: 102 MMHG | HEIGHT: 73 IN | WEIGHT: 230 LBS

## 2025-03-10 DIAGNOSIS — L56.8 PHOTOSENSITIVITY: ICD-10-CM

## 2025-03-10 DIAGNOSIS — R11.0 NAUSEA: ICD-10-CM

## 2025-03-10 DIAGNOSIS — G43.719 INTRACTABLE CHRONIC MIGRAINE WITHOUT AURA AND WITHOUT STATUS MIGRAINOSUS: Primary | ICD-10-CM

## 2025-03-10 DIAGNOSIS — R42 DIZZY SPELLS: ICD-10-CM

## 2025-03-10 PROCEDURE — 3078F DIAST BP <80 MM HG: CPT | Performed by: OTHER

## 2025-03-10 PROCEDURE — 3008F BODY MASS INDEX DOCD: CPT | Performed by: OTHER

## 2025-03-10 PROCEDURE — 99204 OFFICE O/P NEW MOD 45 MIN: CPT | Performed by: OTHER

## 2025-03-10 PROCEDURE — 3074F SYST BP LT 130 MM HG: CPT | Performed by: OTHER

## 2025-03-10 RX ORDER — ONDANSETRON 4 MG/1
4 TABLET, FILM COATED ORAL EVERY 8 HOURS PRN
Qty: 30 TABLET | Refills: 0 | Status: SHIPPED | OUTPATIENT
Start: 2025-03-10

## 2025-03-10 RX ORDER — SUMATRIPTAN SUCCINATE 100 MG/1
TABLET ORAL
Qty: 9 TABLET | Refills: 5 | Status: SHIPPED | OUTPATIENT
Start: 2025-03-10

## 2025-03-10 RX ORDER — TOPIRAMATE 25 MG/1
TABLET, FILM COATED ORAL
Qty: 120 TABLET | Refills: 2 | Status: SHIPPED | OUTPATIENT
Start: 2025-03-10

## 2025-03-10 NOTE — H&P
[facility] New Patient / Consult Visit    Nahum Rod is a 34 year old male.                         Referring MD: Yojana Pedroza    Chief Complaint   Patient presents with    Neurologic Problem     Referred by PCP, C/O \"new onset migraine\", notes daily/constant head pain/burning/\"squeezing\" sensation, using beta-blocker w/ some relief, using OTC Tylenol #3 tabs w/ \"optimal relief\"       HPI:    Nahum Rod is a 34 year old, who presents for evaluation of migraines    Patient states around 9/2024, he had a stressful event and found out the mother of his daughter (ex girlfriend) was cheating on him and then noted poor sleep and subsequently had severe tension type headache with \"squeezing sensation\" over both sides of the head, along with light sensitivity. He was only having ~3 hrs of sleep per night initially and also had been feeling dizzy.      Since 12/2024, he has been having nausea and dizziness but not light sensitivity. He would have these episodes every day and then would have headaches as well.      He has been having some mild headache in the back of the head but this may get more severe after dizzy spells last for about an hour but denies visual aura.     Older sister has migraines and father may have mild migraines; denies focal numbness/tingling/weakness or double vision of headaches worsening with exertion or bowel movement.     He was started on propranolol by his PCP but is only on 40 mg daily.       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   Otherwise, patient denies any recent weight change, fevers, chills, nausea, double vision/ blurry vision / loss of vision, chest pain, palpitations, shortness of breath, rashes, joint pains, bowel / bladder incontinence or mood issues.     Past  Medical History:    Allergic rhinitis    Anal fissure    Depression    Elevated blood pressure    Hallucinations    History of chicken pox    Maxillary sinus cyst    1.5 cm polyp or retention cyst in Rt maxillary sinus    Mood disorder    Psychosis (HCC)     History reviewed. No pertinent surgical history.  Social History     Socioeconomic History    Marital status: Single   Tobacco Use    Smoking status: Former     Current packs/day: 0.00     Types: Cigarettes     Quit date: 2017     Years since quittin.1     Passive exposure: Current    Smokeless tobacco: Former   Vaping Use    Vaping status: Never Used   Substance and Sexual Activity    Alcohol use: Not Currently     Comment: 1 X 6 MONTH    Drug use: No     Types: Cannabis   Other Topics Concern    Caffeine Concern No    Stress Concern Yes    Weight Concern No    Special Diet No    Exercise No    Seat Belt No     Social Drivers of Health      [facility]    Housing Stability     Family History   Problem Relation Age of Onset    Cancer Paternal Grandmother     Cancer Maternal Grandfather     Lipids Maternal Grandmother         hyperlipidemia    Hypertension Maternal Grandmother     Diabetes Maternal Grandmother     Heart Disorder Maternal Grandmother         atrial fibrillation    Hypertension Father     Thyroid Disorder Father         hypothyroidism    Lipids Father         hyperlipidemia    Diabetes Father     Lung Disorder Father         COPD    Hypertension Mother     Thyroid Disorder Mother         hypothyroidism    Diabetes Mother     Heart Disorder Mother     Hypertension Paternal Grandfather     Lipids Paternal Grandfather         hyperlipidemia    Stroke Paternal Grandfather     Diabetes Paternal Grandfather     Renal Disease Paternal Grandfather         renal failure    Arthritis Paternal Grandfather     Cancer Maternal Grandfather     Thyroid Disorder Other         hypothyroidism, Siblings    Arthritis Maternal  Grandfather     Cancer Sister        Allergies:  Allergies[1]   Current Meds:  Current Outpatient Medications   Medication Sig Dispense Refill    topiramate 25 MG Oral Tab start at 25 mg nightly x1 week, then increase to 50 mg nightly x1 week, then 25 mg AM / 50 mg PM x1 week, then 50 mg bid 120 tablet 2    SUMAtriptan Succinate 100 MG Oral Tab Use at onset; repeat once after 2 HRS-ONLY 2 IN 24 HR MAX.  This is a 30 day supply. 9 tablet 5    ondansetron (ZOFRAN) 4 mg tablet Take 1 tablet (4 mg total) by mouth every 8 (eight) hours as needed for Nausea. 30 tablet 0    buPROPion  MG Oral Tablet 12 Hr TAKE 1 TABLET BY MOUTH EVERY DAY FOR 3 DAYS, THEN 1 TAB 2 TIMES A DAY THEREAFTER. QUIT SMOKING ON DAY 8 OF MEDICATION 90 tablet 0    propranolol 40 MG Oral Tab Take 1 tablet (40 mg total) by mouth daily. 90 tablet 0    fluticasone propionate 50 MCG/ACT Nasal Suspension USE TWO SPRAY(S) IN EACH NOSTRIL ONCE DAILY 3 each 3    Fexofenadine HCl (ALLEGRA OR)             ROS:   A comprehensive 10 point review of systems was completed.  Pertinent positives and negatives noted in the the HPI.      PHYSICAL EXAM:   /58 (BP Location: Left arm, Patient Position: Sitting, Cuff Size: adult)   Pulse 92   Resp 16   Ht 73\"   Wt 230 lb (104.3 kg)   BMI 30.34 kg/m²   Estimated body mass index is 30.34 kg/m² as calculated from the following:    Height as of this encounter: 73\".    Weight as of this encounter: 230 lb (104.3 kg).    GENERAL: well developed, well nourished, in no apparent distress  SKIN: no rashes  EYES: sclera anicteric, conjunctiva normal  HEENT: normocephalic  CARDIOVASCULAR: S1, S2 normal, RRR  LUNGS: clear to auscultation bilaterally  EXTREMITIES: no cyanosis, peripheral pulses intact    Neck: Supple; full range of motion; no carotid bruits    Mental status:  Alert and oriented to time, place, person, and situation  Speech: fluent  Language: normal naming, repetition, and comprehension  Memory:  normal  Attention/concentration: normal    Fundoscopic Exam: optic discs sharp bilaterally    Cranial Nerves: II through XII  Optic:    Pupils: equally round and reactive to light with direct and consensual responses, normal accomodation   Visual acuity: Normal              Visual fields: Normal  Oculomotor/Trochlear/Abducens:    Eye Movements: EOMI without nystagmus  Trigeminal:   Facial sensation:intact to light touch bilaterally  Facial:   Smile symmetric, eyebrow raise symmetric  Vestibulocochlear:   Hearing: normal bilaterally  Glossopharyngeal/Vagus:   Palate elevates symmetrically with midline uvula  Spinal accessory:   Shoulder Shrug: normal bilaterally   Lateral head turn: normal bilaterally  Hypoglossal:   Tongue movement: protrusion is midline with normal lateral movements    Motor System:  Strength: 5/5 throughout  Tone: normal    Sensory:  Pin is normal  Vibration is normal  Proprioception is normal  Romberg is absent    Coordination:  Finger to nose normal bilaterally  Rapid alternating movements normal bilaterally  Heel to shin is normal bilaterally    DTRs:   2+, symmetric throughout, toes downgoing bilaterally; no clonus          Gait:  Normal casual, heel, toe and tandem gait    TEST RESULTS/DATA REVIEWED:   Labs and imaging reviewed in EMR    IMPRESSION AND PLAN:   Nahum Rod is a 34 year old male who presents for evaluation of migraines    Patient states around 9/2024, he had a stressful event and found out the mother of his daughter (ex girlfriend) was cheating on him and then noted poor sleep and subsequently had severe tension type headache with \"squeezing sensation\" over both sides of the head, along with light sensitivity. He was only having ~3 hrs of sleep per night initially and also had been feeling dizzy.      Since 12/2024, he has been having nausea and dizziness but not light sensitivity. He would have these episodes every day and then would have headaches as well.      He has  been having some mild headache in the back of the head but this may get more severe after dizzy spells last for about an hour but denies visual aura.     Older sister has migraines and father may have mild migraines; denies focal numbness/tingling/weakness or double vision of headaches worsening with exertion or bowel movement.     He was started on propranolol by his PCP but is only on 40 mg daily.       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        Neurologic exam is normal. Patient has episodes of nausea/dizziness, and light sensitivity and headaches, which are most likely migraine but seizure is in differential as well - will check EEG and MRI brain / MRA head/neck to rule out secondary intracranial pathology.   For treatment of presumed migraine, recommend start preventive therapy with topiramate (Topamax) as follows:  start at 25 mg nightly x1 week, then increase to 50 mg nightly x1 week, then 25 mg AM / 50 mg PM x1 week, then 50 mg bid; advised of potential side effects, including but not limited to, cognitive slowing / word finding difficulty, weight loss, decreased appetite, excessive sedation, paresthesias, and increased risk of nephrolithiasis, with need to maintain adequate hydration on the medication      For when migraines occur, recommend take sumatriptan 100 mg (Imitrex) within first 30 minutes of symptoms starting; if not improved after 2 hours, take additional dose, and then stop taking; monitor for chest pain / tightness; in addition, can take Zofran PRN for nausea as well.       1. Intractable chronic migraine without aura and without status migrainosus  As noted above  - topiramate 25 MG Oral Tab; start at 25 mg nightly x1 week, then increase to 50 mg nightly x1 week, then 25 mg AM / 50 mg PM x1 week, then 50 mg  bid  Dispense: 120 tablet; Refill: 2  - EEG; Future  - MRI BRAIN MRA BRAIN+MRA NECK (ALL W+WO) (CPT=70553/88001/01176); Future  - SUMAtriptan Succinate 100 MG Oral Tab; Use at onset; repeat once after 2 HRS-ONLY 2 IN 24 HR MAX.  This is a 30 day supply.  Dispense: 9 tablet; Refill: 5  - ondansetron (ZOFRAN) 4 mg tablet; Take 1 tablet (4 mg total) by mouth every 8 (eight) hours as needed for Nausea.  Dispense: 30 tablet; Refill: 0    2. Nausea  As noted above   - MRI BRAIN MRA BRAIN+MRA NECK (ALL W+WO) (CPT=70553/66783/07995); Future  - ondansetron (ZOFRAN) 4 mg tablet; Take 1 tablet (4 mg total) by mouth every 8 (eight) hours as needed for Nausea.  Dispense: 30 tablet; Refill: 0    3. Dizzy spells  As noted above   - MRI BRAIN MRA BRAIN+MRA NECK (ALL W+WO) (CPT=70553/54477/88062); Future    4. Photosensitivity  As noted above   - MRI BRAIN MRA BRAIN+MRA NECK (ALL W+WO) (CPT=70553/50056/53773); Future    Return in about 2 months (around 5/10/2025).    Copy of note was sent to referring physician.      Arthur Hernandez MD, Neurology  [facility]  Pager [phone]  3/10/2025           [1] No Known Allergies

## 2025-03-10 NOTE — PATIENT INSTRUCTIONS
Refill policies:    Allow 2-3 business days for refills; controlled substances may take longer.  Contact your pharmacy at least 5 days prior to running out of medication and have them send an electronic request or submit request through the “request refill” option in your [employer] account.  Refills are not addressed on weekends; covering physicians do not authorize routine medications on weekends.  No narcotics or controlled substances are refilled after noon on Fridays or by on call physicians.  By law, narcotics must be electronically prescribed.  A 30 day supply with no refills is the maximum allowed.  If your prescription is due for a refill, you may be due for a follow up appointment.  To best provide you care, patients receiving routine medications need to be seen at least once a year.  Patients receiving narcotic/controlled substance medications need to be seen at least once every 3 months.  In the event that your preferred pharmacy does not have the requested medication in stock (e.g. Backordered), it is your responsibility to find another pharmacy that has the requested medication available.  We will gladly send a new prescription to that pharmacy at your request.    Scheduling Tests:    If your physician has ordered radiology tests such as MRI or CT scans, please contact Central Scheduling at [phone] right away to schedule the test.  Once scheduled, the [facility] Centralized Referral Team will work with your insurance carrier to obtain pre-certification or prior authorization.  Depending on your insurance carrier, approval may take 3-10 days.  It is highly recommended patients assure they have received an authorization before having a test performed.  If test is done without insurance authorization, patient may be responsible for the entire amount billed.      Precertification and Prior Authorizations:  If your physician has recommended that you have a procedure or additional testing performed the [facility]  Centralized Referral Team will contact your insurance carrier to obtain pre-certification or prior authorization.    You are strongly encouraged to contact your insurance carrier to verify that your procedure/test has been approved and is a COVERED benefit.  Although the [facility] Centralized Referral Team does its due diligence, the insurance carrier gives the disclaimer that \"Although the procedure is authorized, this does not guarantee payment.\"    Ultimately the patient is responsible for payment.   Thank you for your understanding in this matter.  Paperwork Completion:  If you require FMLA or disability paperwork for your recovery, please make sure to either drop it off or have it faxed to our office at [phone]. Be sure the form has your name and date of birth on it.  The form will be faxed to our Forms Department and they will complete it for you.  There is a 25$ fee for all forms that need to be filled out.  Please be aware there is a 10-14 day turnaround time.  You will need to sign a release of information (TANIKA) form if your paperwork does not come with one.  You may call the Forms Department with any questions at [phone].  Their fax number is [phone].

## 2025-04-07 ENCOUNTER — HOSPITAL ENCOUNTER (OUTPATIENT)
Dept: MRI IMAGING | Facility: HOSPITAL | Age: 35
Discharge: HOME | End: 2025-04-07
Attending: Other
Payer: COMMERCIAL

## 2025-04-07 DIAGNOSIS — L56.8 PHOTOSENSITIVITY: ICD-10-CM

## 2025-04-07 DIAGNOSIS — R42 DIZZY SPELLS: ICD-10-CM

## 2025-04-07 DIAGNOSIS — R11.0 NAUSEA: ICD-10-CM

## 2025-04-07 DIAGNOSIS — G43.719 INTRACTABLE CHRONIC MIGRAINE WITHOUT AURA AND WITHOUT STATUS MIGRAINOSUS: ICD-10-CM

## 2025-04-07 PROCEDURE — 70546 MR ANGIOGRAPH HEAD W/O&W/DYE: CPT | Performed by: OTHER

## 2025-04-07 PROCEDURE — 70549 MR ANGIOGRAPH NECK W/O&W/DYE: CPT | Performed by: OTHER

## 2025-04-07 PROCEDURE — A9575 INJ GADOTERATE MEGLUMI 0.1ML: HCPCS | Performed by: OTHER

## 2025-04-07 PROCEDURE — 70553 MRI BRAIN STEM W/O & W/DYE: CPT | Performed by: OTHER

## 2025-04-07 RX ORDER — GADOTERATE MEGLUMINE 376.9 MG/ML
20 INJECTION INTRAVENOUS
Status: COMPLETED | OUTPATIENT
Start: 2025-04-07 | End: 2025-04-07

## 2025-04-07 RX ADMIN — GADOTERATE MEGLUMINE 20 ML: 376.9 INJECTION INTRAVENOUS at 19:31:00

## 2025-04-30 ENCOUNTER — PATIENT MESSAGE (OUTPATIENT)
Dept: INTERNAL MEDICINE CLINIC | Facility: CLINIC | Age: 35
End: 2025-04-30

## 2025-04-30 RX ORDER — BUPROPION HYDROCHLORIDE 150 MG/1
TABLET, EXTENDED RELEASE ORAL
Qty: 180 TABLET | Refills: 1 | Status: SHIPPED | OUTPATIENT
Start: 2025-04-30

## 2025-05-01 NOTE — TELEPHONE ENCOUNTER
This Novel message has not been read.     Lm for pt (Ok per HIPAA) to inform f/u on Schedulize message regarding hand pain. To call back at the office to further discuss.

## 2025-05-21 RX ORDER — BUPROPION HYDROCHLORIDE 150 MG/1
TABLET, EXTENDED RELEASE ORAL
Qty: 180 TABLET | Refills: 1 | Status: SHIPPED | OUTPATIENT
Start: 2025-05-21

## 2025-05-21 NOTE — TELEPHONE ENCOUNTER
Refill sent to pharmacy on 4/30/2 for 3 month supply with 1 refill  RN to pharmacy call   States they do not see the RX from 4/30  States most recent RX from 3/7/25      Psychiatric Non-Scheduled (Anti-Anxiety) Failed   BUPROPION  MG Oral Tablet 12 Hr [Pharmacy Med Name: buPROPion HCl ER (SR) Oral Tablet Extended Release 12 Hour 150 MG]  5/20/2025 10:00 AM    Medication is active on med list    In person appointment or virtual visit in the past 6 mos or appointment in next 3 mos    Depression Screening completed within the past 12 months

## 2025-06-12 ENCOUNTER — APPOINTMENT (OUTPATIENT)
Dept: GENERAL RADIOLOGY | Age: 35
End: 2025-06-12
Attending: EMERGENCY MEDICINE
Payer: COMMERCIAL

## 2025-06-12 ENCOUNTER — HOSPITAL ENCOUNTER (OUTPATIENT)
Age: 35
Discharge: HOME | End: 2025-06-12
Payer: COMMERCIAL

## 2025-06-12 VITALS
OXYGEN SATURATION: 97 % | TEMPERATURE: 99 F | RESPIRATION RATE: 18 BRPM | SYSTOLIC BLOOD PRESSURE: 123 MMHG | DIASTOLIC BLOOD PRESSURE: 59 MMHG | HEART RATE: 55 BPM

## 2025-06-12 DIAGNOSIS — S62.664A CLOSED NONDISPLACED FRACTURE OF DISTAL PHALANX OF RIGHT RING FINGER, INITIAL ENCOUNTER: ICD-10-CM

## 2025-06-12 DIAGNOSIS — S61.214A LACERATION OF RIGHT RING FINGER WITHOUT FOREIGN BODY WITHOUT DAMAGE TO NAIL, INITIAL ENCOUNTER: Primary | ICD-10-CM

## 2025-06-12 PROCEDURE — 26750 TREAT FINGER FRACTURE EACH: CPT

## 2025-06-12 PROCEDURE — 99213 OFFICE O/P EST LOW 20 MIN: CPT

## 2025-06-12 PROCEDURE — 73140 X-RAY EXAM OF FINGER(S): CPT | Performed by: EMERGENCY MEDICINE

## 2025-06-12 PROCEDURE — 90471 IMMUNIZATION ADMIN: CPT

## 2025-06-12 RX ORDER — CEPHALEXIN 500 MG/1
500 CAPSULE ORAL 4 TIMES DAILY
Qty: 20 CAPSULE | Refills: 0 | Status: SHIPPED | OUTPATIENT
Start: 2025-06-12 | End: 2025-06-17

## 2025-06-12 NOTE — ED PROVIDER NOTES
Patient Seen in: [facility]      History  Chief Complaint   Patient presents with    Arm or Hand Injury     Stated Complaint: finger injury    Subjective:   HPI          34-year-old male presents to immediate care for evaluation of right fourth finger pain after accidentally slamming it in a car door earlier this morning.  Cut his finger.  Last Tdap .      Objective:     Past Medical History:    Allergic rhinitis    Anal fissure    Depression    Elevated blood pressure    Hallucinations    History of chicken pox    Maxillary sinus cyst    1.5 cm polyp or retention cyst in Rt maxillary sinus    Mood disorder    Psychosis (HCC)              History reviewed. No pertinent surgical history.             Social History     Socioeconomic History    Marital status: Single   Tobacco Use    Smoking status: Former     Current packs/day: 0.00     Types: Cigarettes     Quit date: 2017     Years since quittin.3     Passive exposure: Current    Smokeless tobacco: Former   Vaping Use    Vaping status: Never Used   Substance and Sexual Activity    Alcohol use: Not Currently     Comment: 1 X 6 MONTH    Drug use: No     Types: Cannabis   Other Topics Concern    Caffeine Concern No    Stress Concern Yes    Weight Concern No    Special Diet No    Exercise No    Seat Belt No     Social Drivers of Health      Received from [facility]    Housing Stability              Review of Systems    Positive for stated complaint: finger injury  Other systems are as noted in HPI.  Constitutional and vital signs reviewed.      All other systems reviewed and negative except as noted above.      Physical Exam    ED Triage Vitals [25 1002]   /59   Pulse 55   Resp 18   Temp 98.5 °F (36.9 °C)   Temp src Oral   SpO2 97 %   O2 Device None (Room air)       Current Vitals:   Vital Signs  BP: 123/59  Pulse: 55  Resp: 18  Temp: 98.5 °F (36.9 °C)  Temp src: Oral    Oxygen Therapy  SpO2: 97 %  O2  Device: None (Room air)          Physical Exam  Vitals and nursing note reviewed.   Constitutional:       General: He is not in acute distress.     Appearance: Normal appearance. He is not ill-appearing, toxic-appearing or diaphoretic.   Cardiovascular:      Rate and Rhythm: Normal rate.   Pulmonary:      Effort: Pulmonary effort is normal. No respiratory distress.   Musculoskeletal:      Right hand: Swelling (4th proximal phalanx) and tenderness (4th proximal phalanx) present. No deformity.   Skin:     Findings: Laceration present.      Comments: Approximately 1 cm superficial linear laceration to distal right index finger, dorsal aspect. No bleeding.  No subungual hematoma.   Neurological:      Mental Status: He is alert and oriented to person, place, and time.   Psychiatric:         Behavior: Behavior normal.         ED Course  Labs Reviewed - No data to display  XR FINGER(S) (MIN 2 VIEWS), RIGHT 4TH (CPT=73140)  Result Date: 6/12/2025  PROCEDURE:  XR FINGER(S) (MIN 2 VIEWS), RIGHT 4TH (CPT=73140)  INDICATIONS:  finger injury, finger pain  COMPARISON:  None.  TECHNIQUE:  Three views of the finger were obtained.  PATIENT STATED HISTORY: (As transcribed by Technologist)  Patient states she smashed his distal fourth digit in a car door early this morning.   FINDINGS:  Probable comminuted nondisplaced fracture along tuft of the distal phalanx of the 4th digit with associated soft tissue swelling.  Clinical correlation with point of maximal tenderness is suggested.  Normal mineralization.            CONCLUSION:  Probable comminuted nondisplaced fracture along tuft of the distal phalanx of the 4th digit with associated soft tissue swelling.  Clinical correlation with point of maximal tenderness is suggested.   LOCATION:  [place]   Dictated by (CST): Ollie Rouse MD on 6/12/2025 at 10:22 AM     Finalized by (CST): Ollie Rouse MD on 6/12/2025 at 10:22 AM          MDM     Differential diagnosis considered but  not limited to laceration, contusion, subungual hematoma, fracture    Physical exam as above.  X-ray right fourth finger resulted with the following impression:  Probable comminuted nondisplaced fracture along tuft of the distal phalanx of the 4th digit with associated soft tissue swelling.  Patient with point tenderness in this region.  Will treat as fracture.  Finger splint applied.  Asepsis to superficial laceration.  Triple antibiotic ointment and Band-Aid applied.  Tetanus booster given.  Keflex prescribed.  Patient in agreement to continue follow-up with Hand.     Medical Decision Making  Amount and/or Complexity of Data Reviewed  Radiology: ordered and independent interpretation performed. Decision-making details documented in ED Course.    Risk  Prescription drug management.        Disposition and Plan     Clinical Impression:  1. Laceration of right ring finger without foreign body without damage to nail, initial encounter    2. Closed nondisplaced fracture of distal phalanx of right ring finger, initial encounter         Disposition:  Discharge  6/12/2025 11:01 am    Follow-up:  David Douglas MD  [address] [place] [id number]  [phone]                Medications Prescribed:  Current Discharge Medication List        START taking these medications    Details   cephALEXin 500 MG Oral Cap Take 1 capsule (500 mg total) by mouth 4 (four) times daily for 5 days.  Qty: 20 capsule, Refills: 0                   Supplementary Documentation:

## 2025-06-12 NOTE — ED INITIAL ASSESSMENT (HPI)
Pt presents with pain, swelling and laceration to R 4th finger closing in a car door at 0200 this am

## (undated) NOTE — LETTER
Date & Time: 8/26/2019, 2:18 PM  Patient: Lloyd Stroud  Encounter Provider(s):    Luis Feliz MD       To Whom It May Concern:    Corry Griffin was seen and treated in our department on 8/26/2019. He can return to work.     If you have any que

## (undated) NOTE — LETTER
Patient Name: Alexi Rowe  YOB: 1990          MRN number:  [id number]  Date:  7/26/2018  Referring Physician:  Meg Stafford     Discharge Summary    Pt has attended 5, cancelled 0, and no shown 0 visits in Physical Therapy.    Dx: Adalberto Everett    Special tests: SLR negative         PLAN OF CARE:    Goals:    · Pt will improve lower abdominal recruitment to perform proper isometric contraction without requiring verbal or tactile cuing to promote advancement of therex (3 visits) -MET  · Pt will de

## (undated) NOTE — LETTER
Date: 10/5/2024    Patient Name: Nahum Rod          To Whom it may concern:    This letter has been written at the patient's request. The above patient was seen at [facility] for treatment of a medical condition.    This patient should be excused from attending work 10/5/24      Sincerely,    Tamra Matthews PA-C

## (undated) NOTE — LETTER
Date: 2/3/2018    Patient Name: Jasbir Johnson          To Whom it may concern: This letter has been written at the patient's request. The above patient was seen at the [facility] for treatment of a medical condition.     This patient sh

## (undated) NOTE — LETTER
03/11/25    Dear Dr. Yojana Pedroza      Thank you for referring your patient, Nahum Rod to me for an evaluation.  Please see my initial consult note enclosed below.  Let me know if you have any questions.    Thank you  Arthur Hernandez MD, Neurology  [facility]  Pager [phone]  Office:    [address] [place] [id number]  [phone]      3/11/2025    [facility] New Patient / Consult Visit    Nahum Rod is a 34 year old male.                         Referring MD: Yojana Pedroza    Chief Complaint   Patient presents with    Neurologic Problem     Referred by PCP, C/O \"new onset migraine\", notes daily/constant head pain/burning/\"squeezing\" sensation, using beta-blocker w/ some relief, using OTC Tylenol #3 tabs w/ \"optimal relief\"       HPI:    Nahum Rod is a 34 year old, who presents for evaluation of migraines    Patient states around 9/2024, he had a stressful event and found out the mother of his daughter (ex girlfriend) was cheating on him and then noted poor sleep and subsequently had severe tension type headache with \"squeezing sensation\" over both sides of the head, along with light sensitivity. He was only having ~3 hrs of sleep per night initially and also had been feeling dizzy.      Since 12/2024, he has been having nausea and dizziness but not light sensitivity. He would have these episodes every day and then would have headaches as well.      He has been having some mild headache in the back of the head but this may get more severe after dizzy spells last for about an hour but denies visual aura.     Older sister has migraines and father may have mild migraines; denies focal numbness/tingling/weakness or double vision of headaches worsening with exertion or bowel movement.     He was started on propranolol by his PCP but is only on 40 mg daily.       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   Otherwise, patient denies any recent weight change, fevers, chills, nausea, double vision/ blurry vision / loss of vision, chest pain, palpitations, shortness of breath, rashes, joint pains, bowel / bladder incontinence or mood issues.     Past Medical History:    Allergic rhinitis    Anal fissure    Depression    Elevated blood pressure    Hallucinations    History of chicken pox    Maxillary sinus cyst    1.5 cm polyp or retention cyst in Rt maxillary sinus    Mood disorder    Psychosis (HCC)     History reviewed. No pertinent surgical history.  Social History     Socioeconomic History    Marital status: Single   Tobacco Use    Smoking status: Former     Current packs/day: 0.00     Types: Cigarettes     Quit date: 2017     Years since quittin.1     Passive exposure: Current    Smokeless tobacco: Former   Vaping Use    Vaping status: Never Used   Substance and Sexual Activity    Alcohol use: Not Currently     Comment: 1 X 6 MONTH    Drug use: No     Types: Cannabis   Other Topics Concern    Caffeine Concern No    Stress Concern Yes    Weight Concern No    Special Diet No    Exercise No    Seat Belt No     Social Drivers of Health      [facility]    Housing Stability     Family History   Problem Relation Age of Onset    Cancer Paternal Grandmother     Cancer Maternal Grandfather     Lipids Maternal Grandmother         hyperlipidemia    Hypertension Maternal Grandmother     Diabetes Maternal Grandmother     Heart Disorder Maternal Grandmother         atrial fibrillation    Hypertension Father     Thyroid Disorder Father         hypothyroidism    Lipids Father         hyperlipidemia    Diabetes Father     Lung Disorder Father         COPD    Hypertension Mother     Thyroid Disorder Mother         hypothyroidism     Diabetes Mother     Heart Disorder Mother     Hypertension Paternal Grandfather     Lipids Paternal Grandfather         hyperlipidemia    Stroke Paternal Grandfather     Diabetes Paternal Grandfather     Renal Disease Paternal Grandfather         renal failure    Arthritis Paternal Grandfather     Cancer Maternal Grandfather     Thyroid Disorder Other         hypothyroidism, Siblings    Arthritis Maternal Grandfather     Cancer Sister        Allergies:  [Allergies]    [Allergies]  No Known Allergies     Current Meds:  Current Outpatient Medications   Medication Sig Dispense Refill    topiramate 25 MG Oral Tab start at 25 mg nightly x1 week, then increase to 50 mg nightly x1 week, then 25 mg AM / 50 mg PM x1 week, then 50 mg bid 120 tablet 2    SUMAtriptan Succinate 100 MG Oral Tab Use at onset; repeat once after 2 HRS-ONLY 2 IN 24 HR MAX.  This is a 30 day supply. 9 tablet 5    ondansetron (ZOFRAN) 4 mg tablet Take 1 tablet (4 mg total) by mouth every 8 (eight) hours as needed for Nausea. 30 tablet 0    buPROPion  MG Oral Tablet 12 Hr TAKE 1 TABLET BY MOUTH EVERY DAY FOR 3 DAYS, THEN 1 TAB 2 TIMES A DAY THEREAFTER. QUIT SMOKING ON DAY 8 OF MEDICATION 90 tablet 0    propranolol 40 MG Oral Tab Take 1 tablet (40 mg total) by mouth daily. 90 tablet 0    fluticasone propionate 50 MCG/ACT Nasal Suspension USE TWO SPRAY(S) IN EACH NOSTRIL ONCE DAILY 3 each 3    Fexofenadine HCl (ALLEGRA OR)             ROS:   A comprehensive 10 point review of systems was completed.  Pertinent positives and negatives noted in the the HPI.      PHYSICAL EXAM:   /58 (BP Location: Left arm, Patient Position: Sitting, Cuff Size: adult)   Pulse 92   Resp 16   Ht 73\"   Wt 230 lb (104.3 kg)   BMI 30.34 kg/m²   Estimated body mass index is 30.34 kg/m² as calculated from the following:    Height as of this encounter: 73\".    Weight as of this encounter: 230 lb (104.3 kg).    GENERAL: well developed, well nourished, in no apparent  distress  SKIN: no rashes  EYES: sclera anicteric, conjunctiva normal  HEENT: normocephalic  CARDIOVASCULAR: S1, S2 normal, RRR  LUNGS: clear to auscultation bilaterally  EXTREMITIES: no cyanosis, peripheral pulses intact    Neck: Supple; full range of motion; no carotid bruits    Mental status:  Alert and oriented to time, place, person, and situation  Speech: fluent  Language: normal naming, repetition, and comprehension  Memory: normal  Attention/concentration: normal    Fundoscopic Exam: optic discs sharp bilaterally    Cranial Nerves: II through XII  Optic:    Pupils: equally round and reactive to light with direct and consensual responses, normal accomodation   Visual acuity: Normal              Visual fields: Normal  Oculomotor/Trochlear/Abducens:    Eye Movements: EOMI without nystagmus  Trigeminal:   Facial sensation:intact to light touch bilaterally  Facial:   Smile symmetric, eyebrow raise symmetric  Vestibulocochlear:   Hearing: normal bilaterally  Glossopharyngeal/Vagus:   Palate elevates symmetrically with midline uvula  Spinal accessory:   Shoulder Shrug: normal bilaterally   Lateral head turn: normal bilaterally  Hypoglossal:   Tongue movement: protrusion is midline with normal lateral movements    Motor System:  Strength: 5/5 throughout  Tone: normal    Sensory:  Pin is normal  Vibration is normal  Proprioception is normal  Romberg is absent    Coordination:  Finger to nose normal bilaterally  Rapid alternating movements normal bilaterally  Heel to shin is normal bilaterally    DTRs:   2+, symmetric throughout, toes downgoing bilaterally; no clonus          Gait:  Normal casual, heel, toe and tandem gait    TEST RESULTS/DATA REVIEWED:   Labs and imaging reviewed in EMR    IMPRESSION AND PLAN:   Nahum Rod is a 34 year old male who presents for evaluation of migraines    Patient states around 9/2024, he had a stressful event and found out the mother of his daughter (ex girlfriend) was cheating  on him and then noted poor sleep and subsequently had severe tension type headache with \"squeezing sensation\" over both sides of the head, along with light sensitivity. He was only having ~3 hrs of sleep per night initially and also had been feeling dizzy.      Since 12/2024, he has been having nausea and dizziness but not light sensitivity. He would have these episodes every day and then would have headaches as well.      He has been having some mild headache in the back of the head but this may get more severe after dizzy spells last for about an hour but denies visual aura.     Older sister has migraines and father may have mild migraines; denies focal numbness/tingling/weakness or double vision of headaches worsening with exertion or bowel movement.     He was started on propranolol by his PCP but is only on 40 mg daily.       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        Neurologic exam is normal. Patient has episodes of nausea/dizziness, and light sensitivity and headaches, which are most likely migraine but seizure is in differential as well - will check EEG and MRI brain / MRA head/neck to rule out secondary intracranial pathology.   For treatment of presumed migraine, recommend start preventive therapy with topiramate (Topamax) as follows:  start at 25 mg nightly x1 week, then increase to 50 mg nightly x1 week, then 25 mg AM / 50 mg PM x1 week, then 50 mg bid; advised of potential side effects, including but not limited to, cognitive slowing / word finding difficulty, weight loss, decreased appetite, excessive sedation, paresthesias, and increased risk of nephrolithiasis, with need to maintain adequate hydration on the medication      For when migraines occur, recommend take sumatriptan 100 mg (Imitrex) within first 30  minutes of symptoms starting; if not improved after 2 hours, take additional dose, and then stop taking; monitor for chest pain / tightness; in addition, can take Zofran PRN for nausea as well.       1. Intractable chronic migraine without aura and without status migrainosus  As noted above  - topiramate 25 MG Oral Tab; start at 25 mg nightly x1 week, then increase to 50 mg nightly x1 week, then 25 mg AM / 50 mg PM x1 week, then 50 mg bid  Dispense: 120 tablet; Refill: 2  - EEG; Future  - MRI BRAIN MRA BRAIN+MRA NECK (ALL W+WO) (CPT=70553/01539/38628); Future  - SUMAtriptan Succinate 100 MG Oral Tab; Use at onset; repeat once after 2 HRS-ONLY 2 IN 24 HR MAX.  This is a 30 day supply.  Dispense: 9 tablet; Refill: 5  - ondansetron (ZOFRAN) 4 mg tablet; Take 1 tablet (4 mg total) by mouth every 8 (eight) hours as needed for Nausea.  Dispense: 30 tablet; Refill: 0    2. Nausea  As noted above   - MRI BRAIN MRA BRAIN+MRA NECK (ALL W+WO) (CPT=70553/74589/09109); Future  - ondansetron (ZOFRAN) 4 mg tablet; Take 1 tablet (4 mg total) by mouth every 8 (eight) hours as needed for Nausea.  Dispense: 30 tablet; Refill: 0    3. Dizzy spells  As noted above   - MRI BRAIN MRA BRAIN+MRA NECK (ALL W+WO) (CPT=70553/46990/31646); Future    4. Photosensitivity  As noted above   - MRI BRAIN MRA BRAIN+MRA NECK (ALL W+WO) (CPT=70553/94098/52530); Future    Return in about 2 months (around 5/10/2025).    Copy of note was sent to referring physician.      Arthur Hernandez MD, Neurology  [facility]  Pager [phone]  3/10/2025

## (undated) NOTE — LETTER
Date: 2/3/2018    Patient Name: Chinyere Clancy          To Whom it may concern: This letter has been written at the patient's request. The above patient was seen at the [facility] for treatment of a medical condition.     This patient sh